# Patient Record
Sex: FEMALE | Race: WHITE | NOT HISPANIC OR LATINO | Employment: OTHER | URBAN - METROPOLITAN AREA
[De-identification: names, ages, dates, MRNs, and addresses within clinical notes are randomized per-mention and may not be internally consistent; named-entity substitution may affect disease eponyms.]

---

## 2023-04-11 ENCOUNTER — OFFICE VISIT (OUTPATIENT)
Dept: PRIMARY CARE | Facility: CLINIC | Age: 78
End: 2023-04-11
Payer: MEDICARE

## 2023-04-11 DIAGNOSIS — Z00.00 HEALTHCARE MAINTENANCE: ICD-10-CM

## 2023-04-11 DIAGNOSIS — Z71.9 HEALTH EDUCATION/COUNSELING: Primary | ICD-10-CM

## 2023-04-11 PROBLEM — M20.11 ACQUIRED HALLUX VALGUS OF BOTH FEET: Status: ACTIVE | Noted: 2023-04-11

## 2023-04-11 PROBLEM — S90.31XA CONTUSION OF RIGHT FOOT: Status: ACTIVE | Noted: 2023-04-11

## 2023-04-11 PROBLEM — M77.41 METATARSALGIA OF RIGHT FOOT: Status: ACTIVE | Noted: 2023-04-11

## 2023-04-11 PROBLEM — M20.12 ACQUIRED HALLUX VALGUS OF BOTH FEET: Status: ACTIVE | Noted: 2023-04-11

## 2023-04-11 PROBLEM — K21.9 GASTROESOPHAGEAL REFLUX DISEASE: Status: ACTIVE | Noted: 2018-07-26

## 2023-04-11 PROCEDURE — UHSMG PR UH SELECT MEET AND GREET: Performed by: FAMILY MEDICINE

## 2023-04-11 RX ORDER — LOSARTAN POTASSIUM 25 MG/1
TABLET ORAL
COMMUNITY
End: 2024-01-21 | Stop reason: SDUPTHER

## 2023-04-11 RX ORDER — CYANOCOBALAMIN (VITAMIN B-12) 2500 MCG
TABLET, SUBLINGUAL SUBLINGUAL
COMMUNITY
End: 2023-06-06 | Stop reason: ALTCHOICE

## 2023-04-11 RX ORDER — HYDROCHLOROTHIAZIDE 12.5 MG/1
12.5 CAPSULE ORAL DAILY
COMMUNITY
End: 2023-10-16

## 2023-04-11 RX ORDER — EPINEPHRINE 0.3 MG/.3ML
INJECTION SUBCUTANEOUS
COMMUNITY
Start: 2021-03-25

## 2023-04-11 RX ORDER — SPIRONOLACTONE 25 MG
TABLET ORAL
COMMUNITY

## 2023-04-11 RX ORDER — ROSUVASTATIN CALCIUM 10 MG/1
TABLET, COATED ORAL
COMMUNITY
End: 2023-10-16 | Stop reason: SDUPTHER

## 2023-04-11 RX ORDER — OMEPRAZOLE 40 MG/1
CAPSULE, DELAYED RELEASE ORAL
COMMUNITY
Start: 2020-12-22 | End: 2023-11-21 | Stop reason: WASHOUT

## 2023-04-11 RX ORDER — TRIAMCINOLONE ACETONIDE 1 MG/G
CREAM TOPICAL
COMMUNITY
Start: 2021-10-24

## 2023-05-01 ENCOUNTER — TELEPHONE (OUTPATIENT)
Dept: PRIMARY CARE | Facility: CLINIC | Age: 78
End: 2023-05-01
Payer: MEDICARE

## 2023-05-01 DIAGNOSIS — R05.2 SUBACUTE COUGH: Primary | ICD-10-CM

## 2023-05-01 RX ORDER — CODEINE PHOSPHATE AND GUAIFENESIN 10; 100 MG/5ML; MG/5ML
5-10 SOLUTION ORAL EVERY 8 HOURS PRN
Qty: 120 ML | Refills: 0 | Status: SHIPPED | OUTPATIENT
Start: 2023-05-01 | End: 2023-05-06

## 2023-05-01 NOTE — TELEPHONE ENCOUNTER
Sxs started last Tuesday - some hoarseness and cough   Bronchitis recently   Last CXR was about 10 days ago and normal  Occ has a hard time catching her breath  COVID test negative  Occ productive cough (not colored)  No fever/chills  Has used Mucinex - helped slightly   Did use some codeine (tablet)   No throat clearing  Discussed use of antihistamines as being potentially helpful for any cough  But will also try the following as he seem to get benefit from at least a similar med last night      I have personally reviewed the OARRS report for this patient.  The report is scanned into the EMR. I have considered the risks of abuse, dependence, addiction, and diversion.    Requested Prescriptions     Signed Prescriptions Disp Refills    codeine-guaifenesin (Robitussin-AC)  mg/5 mL syrup 120 mL 0     Sig: Take 5-10 mL by mouth every 8 hours if needed for cough for up to 5 days.     Authorizing Provider: BERENICE LOGAN         Plan to follow-up in 48-72 hours or sooner if worsening/concerns

## 2023-05-10 ENCOUNTER — TELEPHONE (OUTPATIENT)
Dept: PRIMARY CARE | Facility: CLINIC | Age: 78
End: 2023-05-10
Payer: MEDICARE

## 2023-05-10 NOTE — TELEPHONE ENCOUNTER
Cough x 3 weeks - still with some nasal drainage and feels in her cough - mouth is dry - taking water - not feeling ill at the moment - Tylenol - recommend a trial of Xyzal - touch base Monday if need be as is going out of town next week

## 2023-06-01 ENCOUNTER — LAB (OUTPATIENT)
Dept: LAB | Facility: LAB | Age: 78
End: 2023-06-01
Payer: MEDICARE

## 2023-06-01 DIAGNOSIS — Z00.00 HEALTHCARE MAINTENANCE: ICD-10-CM

## 2023-06-01 LAB
ALANINE AMINOTRANSFERASE (SGPT) (U/L) IN SER/PLAS: 19 U/L (ref 7–45)
ALBUMIN (G/DL) IN SER/PLAS: 4.3 G/DL (ref 3.4–5)
ALKALINE PHOSPHATASE (U/L) IN SER/PLAS: 67 U/L (ref 33–136)
ANION GAP IN SER/PLAS: 11 MMOL/L (ref 10–20)
ASPARTATE AMINOTRANSFERASE (SGOT) (U/L) IN SER/PLAS: 28 U/L (ref 9–39)
BASOPHILS (10*3/UL) IN BLOOD BY AUTOMATED COUNT: 0.02 X10E9/L (ref 0–0.1)
BASOPHILS/100 LEUKOCYTES IN BLOOD BY AUTOMATED COUNT: 0.4 % (ref 0–2)
BILIRUBIN TOTAL (MG/DL) IN SER/PLAS: 0.5 MG/DL (ref 0–1.2)
C REACTIVE PROTEIN (MG/L) IN SER/PLAS BY HIGH SENSIT: 2.3 MG/L
CALCIDIOL (25 OH VITAMIN D3) (NG/ML) IN SER/PLAS: 58 NG/ML
CALCIUM (MG/DL) IN SER/PLAS: 10.1 MG/DL (ref 8.6–10.3)
CARBON DIOXIDE, TOTAL (MMOL/L) IN SER/PLAS: 29 MMOL/L (ref 21–32)
CHLORIDE (MMOL/L) IN SER/PLAS: 97 MMOL/L (ref 98–107)
CHOLESTEROL (MG/DL) IN SER/PLAS: 183 MG/DL (ref 0–199)
CHOLESTEROL IN HDL (MG/DL) IN SER/PLAS: 87.1 MG/DL
CHOLESTEROL/HDL RATIO: 2.1
CREATININE (MG/DL) IN SER/PLAS: 0.72 MG/DL (ref 0.5–1.05)
EOSINOPHILS (10*3/UL) IN BLOOD BY AUTOMATED COUNT: 0.18 X10E9/L (ref 0–0.4)
EOSINOPHILS/100 LEUKOCYTES IN BLOOD BY AUTOMATED COUNT: 3.4 % (ref 0–6)
ERYTHROCYTE DISTRIBUTION WIDTH (RATIO) BY AUTOMATED COUNT: 13.3 % (ref 11.5–14.5)
ERYTHROCYTE MEAN CORPUSCULAR HEMOGLOBIN CONCENTRATION (G/DL) BY AUTOMATED: 33.3 G/DL (ref 32–36)
ERYTHROCYTE MEAN CORPUSCULAR VOLUME (FL) BY AUTOMATED COUNT: 89 FL (ref 80–100)
ERYTHROCYTES (10*6/UL) IN BLOOD BY AUTOMATED COUNT: 4.44 X10E12/L (ref 4–5.2)
ESTIMATED AVERAGE GLUCOSE FOR HBA1C: 117 MG/DL
GFR FEMALE: 86 ML/MIN/1.73M2
GLUCOSE (MG/DL) IN SER/PLAS: 98 MG/DL (ref 74–99)
HEMATOCRIT (%) IN BLOOD BY AUTOMATED COUNT: 39.6 % (ref 36–46)
HEMOGLOBIN (G/DL) IN BLOOD: 13.2 G/DL (ref 12–16)
HEMOGLOBIN A1C/HEMOGLOBIN TOTAL IN BLOOD: 5.7 %
IMMATURE GRANULOCYTES/100 LEUKOCYTES IN BLOOD BY AUTOMATED COUNT: 0.2 % (ref 0–0.9)
LDL: 80 MG/DL (ref 0–99)
LEUKOCYTES (10*3/UL) IN BLOOD BY AUTOMATED COUNT: 5.3 X10E9/L (ref 4.4–11.3)
LYMPHOCYTES (10*3/UL) IN BLOOD BY AUTOMATED COUNT: 1.76 X10E9/L (ref 0.8–3)
LYMPHOCYTES/100 LEUKOCYTES IN BLOOD BY AUTOMATED COUNT: 33 % (ref 13–44)
MONOCYTES (10*3/UL) IN BLOOD BY AUTOMATED COUNT: 0.68 X10E9/L (ref 0.05–0.8)
MONOCYTES/100 LEUKOCYTES IN BLOOD BY AUTOMATED COUNT: 12.8 % (ref 2–10)
NEUTROPHILS (10*3/UL) IN BLOOD BY AUTOMATED COUNT: 2.68 X10E9/L (ref 1.6–5.5)
NEUTROPHILS/100 LEUKOCYTES IN BLOOD BY AUTOMATED COUNT: 50.2 % (ref 40–80)
PLATELETS (10*3/UL) IN BLOOD AUTOMATED COUNT: 267 X10E9/L (ref 150–450)
POTASSIUM (MMOL/L) IN SER/PLAS: 4.2 MMOL/L (ref 3.5–5.3)
PROTEIN TOTAL: 7.2 G/DL (ref 6.4–8.2)
SODIUM (MMOL/L) IN SER/PLAS: 133 MMOL/L (ref 136–145)
THYROTROPIN (MIU/L) IN SER/PLAS BY DETECTION LIMIT <= 0.05 MIU/L: 1.79 MIU/L (ref 0.44–3.98)
TRIGLYCERIDE (MG/DL) IN SER/PLAS: 82 MG/DL (ref 0–149)
UREA NITROGEN (MG/DL) IN SER/PLAS: 20 MG/DL (ref 6–23)
VLDL: 16 MG/DL (ref 0–40)

## 2023-06-01 PROCEDURE — 82306 VITAMIN D 25 HYDROXY: CPT

## 2023-06-01 PROCEDURE — 85025 COMPLETE CBC W/AUTO DIFF WBC: CPT

## 2023-06-01 PROCEDURE — 86141 C-REACTIVE PROTEIN HS: CPT

## 2023-06-01 PROCEDURE — 80053 COMPREHEN METABOLIC PANEL: CPT

## 2023-06-01 PROCEDURE — 36415 COLL VENOUS BLD VENIPUNCTURE: CPT

## 2023-06-01 PROCEDURE — 83036 HEMOGLOBIN GLYCOSYLATED A1C: CPT

## 2023-06-01 PROCEDURE — 84443 ASSAY THYROID STIM HORMONE: CPT

## 2023-06-01 PROCEDURE — 80061 LIPID PANEL: CPT

## 2023-06-06 ENCOUNTER — OFFICE VISIT (OUTPATIENT)
Dept: PRIMARY CARE | Facility: CLINIC | Age: 78
End: 2023-06-06
Payer: MEDICARE

## 2023-06-06 VITALS
DIASTOLIC BLOOD PRESSURE: 60 MMHG | HEIGHT: 62 IN | WEIGHT: 124 LBS | TEMPERATURE: 97.9 F | OXYGEN SATURATION: 100 % | SYSTOLIC BLOOD PRESSURE: 120 MMHG | HEART RATE: 62 BPM | BODY MASS INDEX: 22.82 KG/M2

## 2023-06-06 DIAGNOSIS — M20.12 ACQUIRED HALLUX VALGUS OF BOTH FEET: ICD-10-CM

## 2023-06-06 DIAGNOSIS — I10 ESSENTIAL HYPERTENSION, BENIGN: ICD-10-CM

## 2023-06-06 DIAGNOSIS — E78.5 DYSLIPIDEMIA: ICD-10-CM

## 2023-06-06 DIAGNOSIS — C67.9 MALIGNANT NEOPLASM OF URINARY BLADDER, UNSPECIFIED SITE (MULTI): ICD-10-CM

## 2023-06-06 DIAGNOSIS — M20.11 ACQUIRED HALLUX VALGUS OF BOTH FEET: ICD-10-CM

## 2023-06-06 DIAGNOSIS — Z82.49 FAMILY HISTORY OF CORONARY ARTERY DISEASE: ICD-10-CM

## 2023-06-06 DIAGNOSIS — K21.9 GASTROESOPHAGEAL REFLUX DISEASE, UNSPECIFIED WHETHER ESOPHAGITIS PRESENT: ICD-10-CM

## 2023-06-06 DIAGNOSIS — Z00.01 ENCOUNTER FOR GENERAL ADULT MEDICAL EXAMINATION WITH ABNORMAL FINDINGS: Primary | ICD-10-CM

## 2023-06-06 PROCEDURE — 1036F TOBACCO NON-USER: CPT | Performed by: FAMILY MEDICINE

## 2023-06-06 PROCEDURE — 3074F SYST BP LT 130 MM HG: CPT | Performed by: FAMILY MEDICINE

## 2023-06-06 PROCEDURE — 3078F DIAST BP <80 MM HG: CPT | Performed by: FAMILY MEDICINE

## 2023-06-06 PROCEDURE — 1159F MED LIST DOCD IN RCRD: CPT | Performed by: FAMILY MEDICINE

## 2023-06-06 PROCEDURE — UHSPHYS PR UH SELECT PHYSICAL: Performed by: FAMILY MEDICINE

## 2023-06-06 RX ORDER — MAGNESIUM GLYCINATE 100 MG
2 CAPSULE ORAL DAILY
COMMUNITY

## 2023-06-06 RX ORDER — TRAZODONE HYDROCHLORIDE 50 MG/1
50 TABLET ORAL NIGHTLY
COMMUNITY
End: 2023-10-16 | Stop reason: SDUPTHER

## 2023-06-06 RX ORDER — ALUMINUM ZIRCONIUM OCTACHLOROHYDREX GLY 16 G/100G
1 GEL TOPICAL EVERY MORNING
COMMUNITY

## 2023-06-06 RX ORDER — DEXTROMETHORPHAN HYDROBROMIDE, GUAIFENESIN 5; 100 MG/5ML; MG/5ML
650 LIQUID ORAL EVERY 8 HOURS PRN
COMMUNITY

## 2023-06-06 ASSESSMENT — ENCOUNTER SYMPTOMS
LOSS OF SENSATION IN FEET: 0
OCCASIONAL FEELINGS OF UNSTEADINESS: 0
DEPRESSION: 0

## 2023-06-06 ASSESSMENT — PATIENT HEALTH QUESTIONNAIRE - PHQ9
1. LITTLE INTEREST OR PLEASURE IN DOING THINGS: NOT AT ALL
2. FEELING DOWN, DEPRESSED OR HOPELESS: NOT AT ALL
SUM OF ALL RESPONSES TO PHQ9 QUESTIONS 1 & 2: 0

## 2023-06-06 ASSESSMENT — PAIN SCALES - GENERAL: PAINLEVEL: 0-NO PAIN

## 2023-06-07 PROBLEM — E78.5 DYSLIPIDEMIA: Status: ACTIVE | Noted: 2023-06-07

## 2023-06-07 PROBLEM — I10 ESSENTIAL HYPERTENSION, BENIGN: Status: ACTIVE | Noted: 2023-06-07

## 2023-06-07 PROBLEM — C67.9 MALIGNANT NEOPLASM OF URINARY BLADDER (MULTI): Status: ACTIVE | Noted: 2023-06-07

## 2023-06-07 PROBLEM — Z82.49 FAMILY HISTORY OF CORONARY ARTERY DISEASE: Status: ACTIVE | Noted: 2023-06-07

## 2023-06-07 ASSESSMENT — ENCOUNTER SYMPTOMS
CONSTIPATION: 0
JOINT SWELLING: 1
SLEEP DISTURBANCE: 0
FEVER: 0
DYSPHORIC MOOD: 0
EYE PAIN: 0
MYALGIAS: 0
BLOOD IN STOOL: 0
TREMORS: 0
FATIGUE: 0
DIARRHEA: 0
DIZZINESS: 0
NERVOUS/ANXIOUS: 0
UNEXPECTED WEIGHT CHANGE: 0
BACK PAIN: 0
SHORTNESS OF BREATH: 0
WEAKNESS: 0
DIAPHORESIS: 0
HEADACHES: 0
ABDOMINAL PAIN: 0

## 2023-06-07 NOTE — PROGRESS NOTES
Subjective   Patient ID: Mckayla Michel is a 78 y.o. female who presents for Annual Exam ([facility]).  HPI  1) bladder cancer being followed at the [facility]  Some issues have arisen with changes in her urine cytology x2  We will have CM call on Monday to determine best next steps  It is possible she may need further surgery and would be interested in having assessment by cardiologist to better understand her fitness for a procedure    2) As noted above, would like cardiology referral  Known history of lipids and blood pressure  Denies chest pain, shortness of breath, dizziness, lightheadedness, hand or foot swelling that is new or different.  She has noticed when she elevates his legs for prolonged period of time it seems to resolve the leg swelling  - taking and tolerating medications well.  Doing well with physical activity.    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   HENT:  Negative for ear pain.    Eyes:  Negative for pain and visual disturbance.   Respiratory:  Negative for shortness of breath.    Cardiovascular:  Positive for leg swelling. Negative for chest pain.   Gastrointestinal:  Negative for abdominal pain, blood in stool, constipation and diarrhea.   Genitourinary:         S/p cystectomy - has stoma and  "bag   Musculoskeletal:  Positive for joint swelling. Negative for back pain and myalgias.        Bunions bilat - sig foot pain - has seen ortho   Skin:  Negative for rash.   Neurological:  Negative for dizziness, tremors, weakness and headaches.   Psychiatric/Behavioral:  Negative for behavioral problems, dysphoric mood and sleep disturbance. The patient is not nervous/anxious.        Objective   /60 (BP Location: Left arm, Patient Position: Sitting, BP Cuff Size: Adult)   Pulse 62   Temp 36.6 °C (97.9 °F) (Temporal)   Ht 1.58 m (5' 2.21\")   Wt 56.2 kg (124 lb)   SpO2 100%   BMI 22.53 kg/m²     Physical Exam  Vitals and nursing note reviewed.   Constitutional:       General: She is not in acute distress.     Appearance: She is not ill-appearing.   HENT:      Head: Normocephalic and atraumatic.      Right Ear: Tympanic membrane normal.      Left Ear: Tympanic membrane normal.      Mouth/Throat:      Pharynx: No posterior oropharyngeal erythema.   Eyes:      General: No scleral icterus.     Extraocular Movements: Extraocular movements intact.      Pupils: Pupils are equal, round, and reactive to light.   Cardiovascular:      Rate and Rhythm: Normal rate and regular rhythm.      Heart sounds: No murmur heard.  Pulmonary:      Breath sounds: Normal breath sounds. No wheezing or rhonchi.   Abdominal:      General: Bowel sounds are normal. There is no distension.      Palpations: Abdomen is soft.      Tenderness: There is no abdominal tenderness.   Musculoskeletal:         General: Deformity present. Normal range of motion.      Cervical back: Normal range of motion.      Comments: Bilat bunions / hallux valgus - BLE swelling L>R   Lymphadenopathy:      Cervical: No cervical adenopathy.   Skin:     General: Skin is warm and dry.   Neurological:      Mental Status: She is alert and oriented to person, place, and time. Mental status is at baseline.      Motor: No weakness.      Coordination: Coordination normal. "      Deep Tendon Reflexes: Reflexes normal.   Psychiatric:         Mood and Affect: Mood normal.         Behavior: Behavior normal.         Thought Content: Thought content normal.         Judgment: Judgment normal.         Assessment/Plan   Problem List Items Addressed This Visit          Circulatory    Essential hypertension, benign    Relevant Orders    Referral to Cardiology       Digestive    Gastroesophageal reflux disease       Genitourinary    Malignant neoplasm of urinary bladder (CMS/HCC)       Musculoskeletal    Acquired hallux valgus of both feet       Other    Dyslipidemia    Relevant Orders    Referral to Cardiology    Family history of coronary artery disease    Relevant Orders    Referral to Cardiology     Other Visit Diagnoses       Encounter for general adult medical examination with abnormal findings    -  Primary        1) bladder cancer - follow-up ml next Mon    2) Lipids/HTN - stable - ref to Cardio     3) cough and voice change - suspect GERD  Add PPI at hs and see if in 1 week makes a diff - consider ENT    4) Foot issuse - refer to [facility]    5) Prev care - has been getting mamms (last in 3/23)  Due for C-scope later this year - would likely have Anderson

## 2023-07-14 ENCOUNTER — TELEPHONE (OUTPATIENT)
Dept: PRIMARY CARE | Facility: CLINIC | Age: 78
End: 2023-07-14
Payer: MEDICARE

## 2023-07-16 NOTE — TELEPHONE ENCOUNTER
Called and discussed with patient  Simply very frustrated about her having procedure postponed  She had reached out to Dr. Quevedo about this and plans to follow-up with her as well as me to see if there might be something that could be done sooner

## 2023-08-17 ENCOUNTER — TELEPHONE (OUTPATIENT)
Dept: PRIMARY CARE | Facility: CLINIC | Age: 78
End: 2023-08-17
Payer: MEDICARE

## 2023-08-24 NOTE — TELEPHONE ENCOUNTER
Called back the other day and discussed with patient  Has been in touch with Dr. Quevedo's office on this and they are working to get information from the Clinic - enc to reach out to us as need be

## 2023-09-14 ENCOUNTER — TELEPHONE (OUTPATIENT)
Dept: PRIMARY CARE | Facility: CLINIC | Age: 78
End: 2023-09-14
Payer: MEDICARE

## 2023-09-14 DIAGNOSIS — R49.0 HOARSENESS: Primary | ICD-10-CM

## 2023-09-14 NOTE — TELEPHONE ENCOUNTER
Called the patient, no answer left message    Given what she is describing, highly suspicious this could be a reflux related phenomenon  I would recommend a trial of either doubling up her current omeprazole dosing or if she is not taking it all then getting back on it at least through the weekend to see how much that can alleviate her hoarseness    Request she call back if like further discussion about this -also could even evaluate in the office tomorrow if need be    Did call back and d/w her - in the end would be best served by seeing ENT - suggested Rosi

## 2023-09-19 ENCOUNTER — TELEPHONE (OUTPATIENT)
Dept: PRIMARY CARE | Facility: CLINIC | Age: 78
End: 2023-09-19
Payer: MEDICARE

## 2023-10-16 DIAGNOSIS — E78.5 DYSLIPIDEMIA: ICD-10-CM

## 2023-10-16 DIAGNOSIS — G47.00 INSOMNIA, UNSPECIFIED TYPE: ICD-10-CM

## 2023-10-16 RX ORDER — TRAZODONE HYDROCHLORIDE 50 MG/1
50 TABLET ORAL NIGHTLY
Qty: 90 TABLET | Refills: 3 | Status: SHIPPED | OUTPATIENT
Start: 2023-10-16 | End: 2023-11-27 | Stop reason: DRUGHIGH

## 2023-10-16 RX ORDER — ROSUVASTATIN CALCIUM 10 MG/1
TABLET, COATED ORAL
Qty: 90 TABLET | Refills: 3 | Status: SHIPPED | OUTPATIENT
Start: 2023-10-16

## 2023-10-19 ENCOUNTER — OFFICE VISIT (OUTPATIENT)
Dept: OTOLARYNGOLOGY | Facility: CLINIC | Age: 78
End: 2023-10-19
Payer: MEDICARE

## 2023-10-19 VITALS — TEMPERATURE: 97.2 F | HEIGHT: 63 IN | BODY MASS INDEX: 21.79 KG/M2 | WEIGHT: 123 LBS

## 2023-10-19 DIAGNOSIS — J37.0 CHRONIC LARYNGITIS: ICD-10-CM

## 2023-10-19 DIAGNOSIS — R49.0 CHRONIC HOARSENESS: Primary | ICD-10-CM

## 2023-10-19 DIAGNOSIS — R49.0 HOARSENESS: ICD-10-CM

## 2023-10-19 DIAGNOSIS — K21.9 GASTROESOPHAGEAL REFLUX DISEASE WITHOUT ESOPHAGITIS: ICD-10-CM

## 2023-10-19 PROCEDURE — 1159F MED LIST DOCD IN RCRD: CPT | Performed by: OTOLARYNGOLOGY

## 2023-10-19 PROCEDURE — 31575 DIAGNOSTIC LARYNGOSCOPY: CPT | Performed by: OTOLARYNGOLOGY

## 2023-10-19 PROCEDURE — 99203 OFFICE O/P NEW LOW 30 MIN: CPT | Performed by: OTOLARYNGOLOGY

## 2023-10-19 PROCEDURE — 1036F TOBACCO NON-USER: CPT | Performed by: OTOLARYNGOLOGY

## 2023-10-19 PROCEDURE — 1126F AMNT PAIN NOTED NONE PRSNT: CPT | Performed by: OTOLARYNGOLOGY

## 2023-10-19 RX ORDER — OMEPRAZOLE 40 MG/1
40 CAPSULE, DELAYED RELEASE ORAL DAILY
Qty: 90 CAPSULE | Refills: 0 | Status: SHIPPED | OUTPATIENT
Start: 2023-10-19 | End: 2023-11-27 | Stop reason: WASHOUT

## 2023-10-19 ASSESSMENT — ENCOUNTER SYMPTOMS: COUGH: 1

## 2023-10-19 NOTE — PROGRESS NOTES
Subjective   Patient ID: Mckayla Michel is a 78 y.o. female who presents for No chief complaint on file..  HPI  Patient is complaining of a chronic history of mild hoarseness.  She has no hemoptysis or dysphagia.  She did have multiple episodes of bronchitis earlier this year treated with antibiotics.  She did have a chest x-ray in April which was negative.   Review of Systems   HENT:  Positive for postnasal drip.         Hoarseness   Respiratory:  Positive for cough.        Objective   Physical Exam  The following elements of a brief ear nose and throat exam were performed: External ear canals and tympanic membranes, external nose and nasal passages, oral cavity, palpation of the neck, percussion of the face, palpation of the thyroid.    There is mild hoarseness noted.  Indirect mirror laryngoscopy is not possible due to a strong gag reflex.  The remainder of her exam was within normal limits.  Fiberoptic laryngoscopy was offered in light of chronic hoarseness.  The nasal cavity and nasopharynx and hypopharynx were noted to be clear.  There was good vocal cord motion noted bilaterally.  There was posterior interarytenoid edema and scarring noted.  Laryngoscopy    Date/Time: 10/19/2023 2:36 PM    Performed by: Pete Aranda MD  Authorized by: Pete Aranda MD    Consent:     Consent obtained:  Verbal  Procedure details:     Indications: hoarseness, dysphagia, or aspiration      Meds administered: Lidocaine and Afrin spray.    Scope location: bilateral nare       Assessment/Plan   Diagnoses and all orders for this visit:  Chronic hoarseness  -     Laryngoscopy  Hoarseness  -     Referral to ENT  Chronic laryngitis  Gastroesophageal reflux disease without esophagitis  -     omeprazole (PriLOSEC) 40 mg DR capsule; Take 1 capsule (40 mg) by mouth once daily. Do not crush or chew.    Chronic gastroesophageal reflux leading to chronic laryngitis and hoarseness exacerbated by the recent episodes of bronchitis.  The patient  was started on omeprazole 20 mg/day instead of 20 mg/day and she was advised to follow acid reflux precautions.  She will follow-up in 1 month.

## 2023-10-19 NOTE — LETTER
October 19, 2023     Luis E Plunkett MD  [address] [facility]  [place] [address]    Patient: Mckayla Michel   YOB: 1945   Date of Visit: 10/19/2023       Dear Dr. Luis E Plunkett MD:    Thank you for referring Mckayla Michel to me for evaluation. Below are my notes for this consultation.  If you have questions, please do not hesitate to call me. I look forward to following your patient along with you.       Sincerely,     Pete Aranda MD      CC: No Recipients  ______________________________________________________________________________________    Subjective  Patient ID: Mckayla Michel is a 78 y.o. female who presents for No chief complaint on file..  HPI  Patient is complaining of a chronic history of mild hoarseness.  She has no hemoptysis or dysphagia.  She did have multiple episodes of bronchitis earlier this year treated with antibiotics.  She did have a chest x-ray in April which was negative.   Review of Systems   HENT:  Positive for postnasal drip.         Hoarseness   Respiratory:  Positive for cough.        Objective  Physical Exam  The following elements of a brief ear nose and throat exam were performed: External ear canals and tympanic membranes, external nose and nasal passages, oral cavity, palpation of the neck, percussion of the face, palpation of the thyroid.    There is mild hoarseness noted.  Indirect mirror laryngoscopy is not possible due to a strong gag reflex.  The remainder of her exam was within normal limits.  Fiberoptic laryngoscopy was offered in light of chronic hoarseness.  The nasal cavity and nasopharynx and hypopharynx were noted to be clear.  There was good vocal cord motion noted bilaterally.  There was posterior interarytenoid edema and scarring noted.  Laryngoscopy    Date/Time: 10/19/2023 2:36 PM    Performed by: Pete Aranda MD  Authorized by: Pete Aranda MD    Consent:     Consent obtained:  Verbal  Procedure details:      Indications: hoarseness, dysphagia, or aspiration      Meds administered: Lidocaine and Afrin spray.    Scope location: bilateral nare       Assessment/Plan  Diagnoses and all orders for this visit:  Chronic hoarseness  -     Laryngoscopy  Hoarseness  -     Referral to ENT  Chronic laryngitis  Gastroesophageal reflux disease without esophagitis  -     omeprazole (PriLOSEC) 40 mg DR capsule; Take 1 capsule (40 mg) by mouth once daily. Do not crush or chew.    Chronic gastroesophageal reflux leading to chronic laryngitis and hoarseness exacerbated by the recent episodes of bronchitis.  The patient was started on omeprazole 20 mg/day instead of 20 mg/day and she was advised to follow acid reflux precautions.  She will follow-up in 1 month.

## 2023-10-25 ENCOUNTER — TELEPHONE (OUTPATIENT)
Dept: OTOLARYNGOLOGY | Facility: CLINIC | Age: 78
End: 2023-10-25
Payer: MEDICARE

## 2023-11-21 ENCOUNTER — OFFICE VISIT (OUTPATIENT)
Dept: OTOLARYNGOLOGY | Facility: CLINIC | Age: 78
End: 2023-11-21
Payer: MEDICARE

## 2023-11-21 VITALS — WEIGHT: 123 LBS | BODY MASS INDEX: 22.63 KG/M2 | HEIGHT: 62 IN

## 2023-11-21 DIAGNOSIS — K21.9 GASTROESOPHAGEAL REFLUX DISEASE WITHOUT ESOPHAGITIS: ICD-10-CM

## 2023-11-21 DIAGNOSIS — R49.0 HOARSENESS: Primary | ICD-10-CM

## 2023-11-21 DIAGNOSIS — J37.0 CHRONIC LARYNGITIS: ICD-10-CM

## 2023-11-21 DIAGNOSIS — R49.0 CHRONIC HOARSENESS: ICD-10-CM

## 2023-11-21 PROCEDURE — 99213 OFFICE O/P EST LOW 20 MIN: CPT | Performed by: OTOLARYNGOLOGY

## 2023-11-21 PROCEDURE — 1036F TOBACCO NON-USER: CPT | Performed by: OTOLARYNGOLOGY

## 2023-11-21 PROCEDURE — 1126F AMNT PAIN NOTED NONE PRSNT: CPT | Performed by: OTOLARYNGOLOGY

## 2023-11-21 PROCEDURE — 1159F MED LIST DOCD IN RCRD: CPT | Performed by: OTOLARYNGOLOGY

## 2023-11-21 RX ORDER — OMEPRAZOLE 40 MG/1
40 CAPSULE, DELAYED RELEASE ORAL DAILY
Qty: 90 CAPSULE | Refills: 1 | Status: SHIPPED | OUTPATIENT
Start: 2023-11-21 | End: 2023-11-27 | Stop reason: WASHOUT

## 2023-11-21 NOTE — PROGRESS NOTES
Subjective   Patient ID: Mckayla Michel is a 78 y.o. female  HPI  Patient presents for follow-up for chronic gastroesophageal reflux with chronic laryngitis and hoarseness.  She is feeling better and her voice is improved but she continues to complain of mild raspiness in her voice.  She has no hemoptysis or dysphagia.  Review of Systems    Objective   Physical Exam  The following elements of a brief ear nose and throat exam were performed: External ear canals and tympanic membranes, external nose and nasal passages, oral cavity, palpation of the neck, percussion of the face, palpation of the thyroid.    The oral cavity is clear.  There is mild hoarseness noted.  Indirect mirror laryngoscopy is limited due to strong gag reflex and there is good vocal cord motion noted bilaterally.  Assessment/Plan   Diagnoses and all orders for this visit:  Hoarseness (Primary)  -     Referral to Physical Therapy; Future  Chronic hoarseness  Chronic laryngitis  Gastroesophageal reflux disease without esophagitis  -     omeprazole (PriLOSEC) 40 mg DR capsule; Take 1 capsule (40 mg) by mouth once daily. Do not crush or chew.  -     Referral to Gastroenterology; Future  -     EGD; Future     Chronic mild hoarseness secondary to a combination of gastroesophageal reflux leading to chronic laryngitis in addition to bowing of the vocal cords and some posterior interarytenoid scarring due to the recent coughing and acid reflux.  Her voice has improved but has not entirely returned to normal.  The patient was advised to continue the omeprazole on a daily basis for 1 more month.  She was also referred to a gastroenterologist for further evaluation and EGD and she was referred to speech therapy.  Her prescription was renewed and she will follow-up in 6 months.

## 2023-11-27 ENCOUNTER — TELEPHONE (OUTPATIENT)
Dept: PRIMARY CARE | Facility: CLINIC | Age: 78
End: 2023-11-27
Payer: MEDICARE

## 2023-11-27 DIAGNOSIS — K21.9 GASTROESOPHAGEAL REFLUX DISEASE WITHOUT ESOPHAGITIS: ICD-10-CM

## 2023-11-27 DIAGNOSIS — G47.00 INSOMNIA, UNSPECIFIED TYPE: ICD-10-CM

## 2023-11-27 RX ORDER — OMEPRAZOLE 40 MG/1
40 CAPSULE, DELAYED RELEASE ORAL DAILY
Qty: 90 CAPSULE | Refills: 0 | COMMUNITY
Start: 2023-11-27 | End: 2024-01-21 | Stop reason: HOSPADM

## 2023-11-27 RX ORDER — TRAZODONE HYDROCHLORIDE 50 MG/1
25 TABLET ORAL NIGHTLY
Qty: 90 TABLET | Refills: 3 | Status: SHIPPED | COMMUNITY
Start: 2023-11-27 | End: 2024-01-21 | Stop reason: SDUPTHER

## 2023-11-27 NOTE — TELEPHONE ENCOUNTER
1) seen by Dr Aranda - was on Omeprazole at hs to reduce hoarseness - better - suggested to go to speech and voice therapist   Is on Trazodone at night and use may be related (seems to be about 1/3 the time) to the dry mouth and perhaps the cough / throat issues     2) will hold off on ST and EGD for now     3) decrease Trazodone to 25 mg - cont PPI - consider updating me next week to see how doing

## 2023-12-07 ENCOUNTER — TELEPHONE (OUTPATIENT)
Dept: PRIMARY CARE | Facility: CLINIC | Age: 78
End: 2023-12-07
Payer: MEDICARE

## 2023-12-07 DIAGNOSIS — K21.9 GASTROESOPHAGEAL REFLUX DISEASE, UNSPECIFIED WHETHER ESOPHAGITIS PRESENT: Primary | ICD-10-CM

## 2023-12-08 RX ORDER — ESOMEPRAZOLE MAGNESIUM 40 MG/1
40 CAPSULE, DELAYED RELEASE ORAL 2 TIMES DAILY
Qty: 60 CAPSULE | Refills: 2 | Status: SHIPPED | OUTPATIENT
Start: 2023-12-08 | End: 2024-01-21 | Stop reason: DRUGHIGH

## 2023-12-08 NOTE — TELEPHONE ENCOUNTER
Called and discussed with patient  Dry mouth really abated when she cut down on her trazodone use -but has led to some sleep struggles, will follow  Still has the hoarseness  Would recommend she switch to esomeprazole and do at least a 5-day trial of that to see if decreases cough    Requested Prescriptions     Signed Prescriptions Disp Refills    esomeprazole (NexIUM) 40 mg DR capsule 60 capsule 2     Sig: Take 1 capsule (40 mg) by mouth 2 times a day. Do not open capsule.     Authorizing Provider: BERENICE LOGAN

## 2023-12-27 ENCOUNTER — APPOINTMENT (OUTPATIENT)
Dept: RADIOLOGY | Facility: CLINIC | Age: 78
End: 2023-12-27
Payer: MEDICARE

## 2024-01-03 ENCOUNTER — TELEPHONE (OUTPATIENT)
Dept: PRIMARY CARE | Facility: CLINIC | Age: 79
End: 2024-01-03
Payer: MEDICARE

## 2024-01-03 NOTE — TELEPHONE ENCOUNTER
Pt is calling in regarsd to talking about a medication and seeing if she should still be on it - DAVONTEP     , Podl patient

## 2024-01-04 ENCOUNTER — APPOINTMENT (OUTPATIENT)
Dept: GASTROENTEROLOGY | Facility: CLINIC | Age: 79
End: 2024-01-04
Payer: MEDICARE

## 2024-01-21 DIAGNOSIS — G47.00 INSOMNIA, UNSPECIFIED TYPE: ICD-10-CM

## 2024-01-21 DIAGNOSIS — I10 ESSENTIAL HYPERTENSION, BENIGN: Primary | ICD-10-CM

## 2024-01-21 DIAGNOSIS — K21.9 GASTROESOPHAGEAL REFLUX DISEASE, UNSPECIFIED WHETHER ESOPHAGITIS PRESENT: ICD-10-CM

## 2024-01-21 RX ORDER — HYDROGEN PEROXIDE 3 %
20 SOLUTION, NON-ORAL MISCELLANEOUS 2 TIMES DAILY
Qty: 180 CAPSULE | Refills: 3 | Status: SHIPPED | OUTPATIENT
Start: 2024-01-21

## 2024-01-21 RX ORDER — LOSARTAN POTASSIUM 25 MG/1
25 TABLET ORAL DAILY
Qty: 90 TABLET | Refills: 3 | Status: SHIPPED | OUTPATIENT
Start: 2024-01-21 | End: 2024-01-22 | Stop reason: SDUPTHER

## 2024-01-21 RX ORDER — TRAZODONE HYDROCHLORIDE 50 MG/1
25 TABLET ORAL NIGHTLY
Qty: 45 TABLET | Refills: 3 | Status: SHIPPED | OUTPATIENT
Start: 2024-01-21 | End: 2024-05-21 | Stop reason: SDUPTHER

## 2024-01-21 RX ORDER — HYDROGEN PEROXIDE 3 %
20 SOLUTION, NON-ORAL MISCELLANEOUS 2 TIMES DAILY
Qty: 180 CAPSULE | Refills: 3 | Status: SHIPPED | OUTPATIENT
Start: 2024-01-21 | End: 2024-01-21 | Stop reason: HOSPADM

## 2024-01-21 NOTE — PROGRESS NOTES
Reached out to patient about trazodone prescription  She has done much better on the lower dose and has no dry mouth at present  Would like to try 25 mg tablet if available    Also feels her cough is really absent now - found that the esomeprazole change really made a difference  We will reduce her dosing to 20 mg twice daily and she will see if she can taper off from there    Also, needs refill on her losartan    Will send all prescriptions to [facility] as requested    Requested Prescriptions     Signed Prescriptions Disp Refills    losartan (Cozaar) 25 mg tablet 90 tablet 3     Sig: Take 1 tablet (25 mg) by mouth once daily.    traZODone (Desyrel) 50 mg tablet 45 tablet 3     Sig: Take 0.5 tablets (25 mg) by mouth once daily at bedtime.    esomeprazole (NexIUM) 20 mg DR capsule 180 capsule 3     Sig: Take 1 capsule (20 mg) by mouth 2 times a day. Do not open capsule.   \

## 2024-01-22 DIAGNOSIS — I10 ESSENTIAL HYPERTENSION, BENIGN: ICD-10-CM

## 2024-01-22 RX ORDER — LOSARTAN POTASSIUM 25 MG/1
25 TABLET ORAL DAILY
Qty: 90 TABLET | Refills: 3 | Status: SHIPPED | OUTPATIENT
Start: 2024-01-22

## 2024-01-31 DIAGNOSIS — Z12.31 ENCOUNTER FOR SCREENING MAMMOGRAM FOR MALIGNANT NEOPLASM OF BREAST: ICD-10-CM

## 2024-04-18 DIAGNOSIS — Z00.00 HEALTHCARE MAINTENANCE: ICD-10-CM

## 2024-05-02 ENCOUNTER — HOSPITAL ENCOUNTER (OUTPATIENT)
Dept: RADIOLOGY | Facility: EXTERNAL LOCATION | Age: 79
Discharge: HOME | End: 2024-05-02
Payer: MEDICARE

## 2024-05-02 ENCOUNTER — TELEPHONE (OUTPATIENT)
Dept: PRIMARY CARE | Facility: CLINIC | Age: 79
End: 2024-05-02
Payer: MEDICARE

## 2024-05-02 DIAGNOSIS — R92.8 ABNORMALITY OF RIGHT BREAST ON SCREENING MAMMOGRAM: ICD-10-CM

## 2024-05-02 NOTE — TELEPHONE ENCOUNTER
She recently had a mammogram done at [facility].  She she received notification that there were some concerns with the right breast and re imaging was needed.  [facility] is requesting an order before they will schedule her for anything.   I will fax over diagnostic mammo this date.  Patient notified and will schedule.

## 2024-05-03 NOTE — TELEPHONE ENCOUNTER
Had called back to patient yesterday evening and discussed her mammogram findings  She has had a abnormal screening mammogram in the past due to dense breast tissue  Not a high degree of suspicion on this report itself, but still needs to pursue, she plans to do so    States she is still having some difficulty getting off the acid reducer in the evening  Finds the hoarseness does return when she stops it  She also questions the need to follow-up with ENT  Suggest she could defer that for now but would recommend just getting back on the acid reducer and monitor things going forward and perhaps do another challenge down the road of try to go off of it at night

## 2024-05-14 ENCOUNTER — APPOINTMENT (OUTPATIENT)
Dept: OTOLARYNGOLOGY | Facility: CLINIC | Age: 79
End: 2024-05-14
Payer: MEDICARE

## 2024-05-21 DIAGNOSIS — G47.00 INSOMNIA, UNSPECIFIED TYPE: ICD-10-CM

## 2024-05-21 RX ORDER — TRAZODONE HYDROCHLORIDE 50 MG/1
25-50 TABLET ORAL NIGHTLY
Qty: 90 TABLET | Refills: 1 | Status: SHIPPED | OUTPATIENT
Start: 2024-05-21

## 2024-06-06 ASSESSMENT — PROMIS GLOBAL HEALTH SCALE
RATE_AVERAGE_FATIGUE: MILD
EMOTIONAL_PROBLEMS: RARELY
RATE_AVERAGE_PAIN: 2
CARRYOUT_PHYSICAL_ACTIVITIES: MODERATELY
RATE_QUALITY_OF_LIFE: VERY GOOD
RATE_PHYSICAL_HEALTH: VERY GOOD
RATE_GENERAL_HEALTH: VERY GOOD
CARRYOUT_SOCIAL_ACTIVITIES: VERY GOOD
RATE_SOCIAL_SATISFACTION: EXCELLENT
RATE_MENTAL_HEALTH: EXCELLENT

## 2024-06-12 ENCOUNTER — OFFICE VISIT (OUTPATIENT)
Dept: PRIMARY CARE | Facility: CLINIC | Age: 79
End: 2024-06-12
Payer: MEDICARE

## 2024-06-12 ENCOUNTER — APPOINTMENT (OUTPATIENT)
Dept: PRIMARY CARE | Facility: CLINIC | Age: 79
End: 2024-06-12
Payer: MEDICARE

## 2024-06-12 VITALS
WEIGHT: 122 LBS | SYSTOLIC BLOOD PRESSURE: 123 MMHG | TEMPERATURE: 98 F | OXYGEN SATURATION: 100 % | BODY MASS INDEX: 22.45 KG/M2 | DIASTOLIC BLOOD PRESSURE: 80 MMHG | HEIGHT: 62 IN | HEART RATE: 59 BPM

## 2024-06-12 VITALS
SYSTOLIC BLOOD PRESSURE: 123 MMHG | WEIGHT: 122 LBS | HEART RATE: 59 BPM | HEIGHT: 62 IN | BODY MASS INDEX: 22.45 KG/M2 | OXYGEN SATURATION: 100 % | TEMPERATURE: 98 F | DIASTOLIC BLOOD PRESSURE: 80 MMHG

## 2024-06-12 DIAGNOSIS — Z79.899 MEDICATION MANAGEMENT: ICD-10-CM

## 2024-06-12 DIAGNOSIS — R05.8 OTHER COUGH: ICD-10-CM

## 2024-06-12 DIAGNOSIS — R93.1 HIGH CORONARY ARTERY CALCIUM SCORE: ICD-10-CM

## 2024-06-12 DIAGNOSIS — Z00.00 MEDICARE ANNUAL WELLNESS VISIT, SUBSEQUENT: Primary | ICD-10-CM

## 2024-06-12 DIAGNOSIS — I10 ESSENTIAL HYPERTENSION, BENIGN: ICD-10-CM

## 2024-06-12 DIAGNOSIS — G43.109 OCULAR MIGRAINE: ICD-10-CM

## 2024-06-12 DIAGNOSIS — R53.83 OTHER FATIGUE: ICD-10-CM

## 2024-06-12 DIAGNOSIS — R53.83 FATIGUE, UNSPECIFIED TYPE: ICD-10-CM

## 2024-06-12 DIAGNOSIS — Z00.01 ENCOUNTER FOR GENERAL ADULT MEDICAL EXAMINATION WITH ABNORMAL FINDINGS: Primary | ICD-10-CM

## 2024-06-12 DIAGNOSIS — E73.9 LACTOSE INTOLERANCE, UNSPECIFIED: ICD-10-CM

## 2024-06-12 DIAGNOSIS — K21.9 GASTROESOPHAGEAL REFLUX DISEASE, UNSPECIFIED WHETHER ESOPHAGITIS PRESENT: ICD-10-CM

## 2024-06-12 DIAGNOSIS — R49.0 HOARSENESS: ICD-10-CM

## 2024-06-12 DIAGNOSIS — E78.5 DYSLIPIDEMIA: ICD-10-CM

## 2024-06-12 PROCEDURE — 3074F SYST BP LT 130 MM HG: CPT | Performed by: FAMILY MEDICINE

## 2024-06-12 PROCEDURE — 1159F MED LIST DOCD IN RCRD: CPT | Performed by: FAMILY MEDICINE

## 2024-06-12 PROCEDURE — 1036F TOBACCO NON-USER: CPT | Performed by: FAMILY MEDICINE

## 2024-06-12 PROCEDURE — 1126F AMNT PAIN NOTED NONE PRSNT: CPT | Performed by: FAMILY MEDICINE

## 2024-06-12 PROCEDURE — 3079F DIAST BP 80-89 MM HG: CPT | Performed by: FAMILY MEDICINE

## 2024-06-12 PROCEDURE — UHSPHYS PR UH SELECT PHYSICAL: Performed by: FAMILY MEDICINE

## 2024-06-12 PROCEDURE — G0439 PPPS, SUBSEQ VISIT: HCPCS | Performed by: FAMILY MEDICINE

## 2024-06-12 ASSESSMENT — LIFESTYLE VARIABLES
HOW MANY STANDARD DRINKS CONTAINING ALCOHOL DO YOU HAVE ON A TYPICAL DAY: 1 OR 2
HOW OFTEN DO YOU HAVE A DRINK CONTAINING ALCOHOL: 2-4 TIMES A MONTH
HOW OFTEN DO YOU HAVE SIX OR MORE DRINKS ON ONE OCCASION: NEVER
SKIP TO QUESTIONS 9-10: 1
AUDIT-C TOTAL SCORE: 2

## 2024-06-12 ASSESSMENT — ENCOUNTER SYMPTOMS
OCCASIONAL FEELINGS OF UNSTEADINESS: 1
DEPRESSION: 0
LOSS OF SENSATION IN FEET: 0

## 2024-06-12 ASSESSMENT — PAIN SCALES - GENERAL
PAINLEVEL: 0-NO PAIN
PAINLEVEL: 0-NO PAIN

## 2024-06-12 ASSESSMENT — PATIENT HEALTH QUESTIONNAIRE - PHQ9
1. LITTLE INTEREST OR PLEASURE IN DOING THINGS: NOT AT ALL
SUM OF ALL RESPONSES TO PHQ9 QUESTIONS 1 & 2: 0
2. FEELING DOWN, DEPRESSED OR HOPELESS: NOT AT ALL

## 2024-06-12 NOTE — PROGRESS NOTES
Subjective   Patient ID: Mckayla Michel is a 79 y.o. female who presents for Annual Exam ([facility]).  HPI  1) ongoing issues w/ hoarseness and cough  Taking esomeprazole just once daily which has helped to some degree with both occasional cough that is not overly productive  No classic heartburn per se, food not getting stuck on the way down  However she can feel some mucus more central airway higher up  No marked wheeze with this  Question if she has had some postnasal drip  She did start loratadine on her own and that seemed to do improve things but question if it gave her some sensation of symmetric headache and gestures at times over the brow and other times a bit more over the top of the head    2) question some fatigue although she is able to keep up with her exercise routine  Sleep patterns have not really changed, alcohol intake is not really changed (1 to 2 glasses up to 2 times a week)  No bruising or bleeding issues  No prior history of B12 deficiency noted    3) prior history of elevated coronary artery calcium scoring test follow-up hypertension/lipids - denies chest pain, shortness of breath, dizziness, lightheadedness, hand or foot swelling that is new or different.  Taking and tolerating medications well.  Doing well with physical activity - would like referral to cardio (Suggest Dr Patrick)    4) wonders if she may have some issues with lactose intolerance  She normally has yogurt in the afternoon hours and finds she can be quite gassy later in the day which is new  She is planning to see Dr. Anderson sometime in the near future  Did discuss trial of Lactaid    5) prevention  Immunizations: Due for Prevnar 20  Breast cancer screening: Just completed the end of last month  Colon cancer screening: Will be seeing Dr. Anderson, await [facility], last C-csope Frankel in 2018, suggest 5 yr follow-up  Skin cancer: see Dr Araiza    Review of Systems  Essentially noncontributory otherwise    Objective \  BP  "123/80 (BP Location: Left arm, Patient Position: Sitting, BP Cuff Size: Adult) Comment: 160/79, 161/89 initially  Pulse 59   Temp 36.7 °C (98 °F) (Temporal)   Ht 1.569 m (5' 1.77\")   Wt 55.3 kg (122 lb)   SpO2 100%   BMI 22.48 kg/m²       Physical Exam  Vitals and nursing note reviewed.   Constitutional:       General: She is not in acute distress.     Appearance: She is not ill-appearing.   HENT:      Head: Normocephalic and atraumatic.      Right Ear: Tympanic membrane normal.      Left Ear: Tympanic membrane normal.      Nose: Nose normal.      Comments: No marked bogginess     Mouth/Throat:      Mouth: Mucous membranes are moist.      Pharynx: Oropharynx is clear. No posterior oropharyngeal erythema.      Comments: No lymphoid hyperplasia  Eyes:      General: No scleral icterus.     Extraocular Movements: Extraocular movements intact.   Cardiovascular:      Rate and Rhythm: Normal rate and regular rhythm.      Heart sounds: No murmur heard.  Pulmonary:      Breath sounds: Rhonchi present. No wheezing.   Chest:      Chest wall: Tenderness present.   Abdominal:      General: There is no distension.      Palpations: Abdomen is soft.      Tenderness: There is no abdominal tenderness.   Musculoskeletal:         General: Normal range of motion.      Cervical back: Normal range of motion.      Right lower leg: No edema.      Left lower leg: No edema.   Lymphadenopathy:      Cervical: No cervical adenopathy.   Skin:     General: Skin is warm and dry.   Neurological:      Mental Status: She is alert and oriented to person, place, and time. Mental status is at baseline.      Motor: No weakness.      Coordination: Coordination normal.      Deep Tendon Reflexes: Reflexes normal.   Psychiatric:         Mood and Affect: Mood normal.         Behavior: Behavior normal.         Thought Content: Thought content normal.         Judgment: Judgment normal.         Assessment/Plan   Problem List Items Addressed This Visit       " Gastroesophageal reflux disease    Dyslipidemia    Relevant Orders    Referral to Cardiology    Essential hypertension, benign    Relevant Orders    Referral to Cardiology     Other Visit Diagnoses       Encounter for general adult medical examination with abnormal findings    -  Primary    Hoarseness        Other cough        Fatigue, unspecified type        Ocular migraine        Lactose intolerance, unspecified        High coronary artery calcium score        Relevant Orders    Referral to Cardiology    Medication management        Relevant Medications    calcium-chondr-collag-glycosam 300 mg calcium- 1,700 mg/scoop powder        1) ongoing issues w/ hoarseness and cough - in light of upper airway coarse sounds will do trial of more potent H1B (Xyzal) at hs x 1 week - then consider trial off of Nexium - also will see Dr Anderson (poss EGD)     2)  fatigue -see with switch of antihistamine to nighttime yields, see what happens when she does try to go off the Nexium, also check CBC, B12 and TSH    3) some concerns about better understanding her cardiovascular risk, will refer to Dr. Patrick    4) ? lactose intolerance - trial of Lactaid    5) Await labs

## 2024-06-12 NOTE — PROGRESS NOTES
Mckayla Michel is a 79 year old here for a Medicare Annual Wellness Visit     Health Risk Assessment  In general, health is:  good     Concerns with balance:  yes, but is keeping up with activity      Concerns with teeth or dentures:  no      Concerns with sexual function:  no     Felt anxious, stressed, angry, irritable, lonely, isolated, or had thoughts of hurting themself:  to some degree      Has little interest or pleasure in doing things:  no     Bothered by feeling down, depressed, or hopeless:  no     Needs help with grocery shopping, cooking, housework, bathing, grooming, dressing, eating, sitting or standing, walking, using the toilet, handling finances, taking medications, using the telephone, or driving:  no      Following safety precautions in the home environment and vehicle: removed throw rugs from floors, installed grab bars in the bathroom, handrails in stairwells, having adequate lighting, wearing seatbelt at all times?:  yes     Smokes cigarettes, vapes, or chew tobacco:  no     Eats healthy foods including fruits, vegetables, whole grains, and fiber-rich foods:  yes     Number of days per week engages in exercise:  daily     Average alcohol consumption: 1-2 glasses 2x/week        Current Providers  Specialists: I have reviewed specialist-related care of the patient in the medical record.   Urol (Charlotte)   Ophtho (Alexi)  Derm (Jose G)  DDS (Zeinab)  Medical/Family history review  Reviewed and updated problem list, medical/surgical/family/social history, medications, and allergies.     Opioid use review  Patient use of opioids:  0           Depression screening  Depression Screening PHQ-2 Score   Today 0         Depression screening tool completed and reviewed. Based on score and interview, patient is not at risk for depression. Screening tool discussed with patient, and I recommended no further intervention at this time.     Cognitive screening  Mini Cog Score:  5/5     Cognitive  "screening reviewed and plan:  not indicated      Functional Observation  Was the patient's timed Up & Go test unsteady or >= 12 seconds?  no     Advance Care Planning  End of Life planning discussed, including patient's advanced directive wishes:  yes    Vision and Hearing assessment  Visual acuity (required for Welcome to Medicare):  past Winter   Hearing Evaluation:  does not have trouble other than at restaurants     ====================================================    /80 (BP Location: Left arm, Patient Position: Sitting, BP Cuff Size: Adult)   Pulse 59   Temp 36.7 °C (98 °F) (Temporal)   Ht 1.569 m (5' 1.77\")   Wt 55.3 kg (122 lb)   SpO2 100%   BMI 22.48 kg/m²     Chief Complaint   Patient presents with    Medicare Annual Wellness Visit Subsequent       HPI  1) ongoing issues w/ hoarseness and cough  Taking esomeprazole just once daily which has helped to some degree with both occasional cough that is not overly productive  No classic heartburn per se, food not getting stuck on the way down  However she can feel some mucus more central airway higher up  No marked wheeze with this  Question if she has had some postnasal drip  She did start loratadine on her own and that seemed to do improve things but question if it gave her some sensation of symmetric headache and gestures at times over the brow and other times a bit more over the top of the head    2) question some fatigue although she is able to keep up with her exercise routine  Sleep patterns have not really changed, alcohol intake is not really changed (1 to 2 glasses up to 2 times a week)  No bruising or bleeding issues  No prior history of B12 deficiency noted    3) prior history of elevated coronary artery calcium scoring test follow-up hypertension/lipids - denies chest pain, shortness of breath, dizziness, lightheadedness, hand or foot swelling that is new or different.  Taking and tolerating medications well.  Doing well with physical " "activity - would like referral to cardio (Suggest Dr Patrick)    4) wonders if she may have some issues with lactose intolerance  She normally has yogurt in the afternoon hours and finds she can be quite gassy later in the day which is new  She is planning to see Dr. Anderson sometime in the near future  Did discuss trial of Lactaid    5) prevention  Immunizations: Due for Prevnar 20  Breast cancer screening: Just completed the end of last month  Colon cancer screening: Will be seeing Dr. Anderson, await CBC, last C-csope Frankel in 2018, suggest 5 yr follow-up  Skin cancer: see Dr Araiza    Review of Systems  Essentially noncontributory otherwise    Objective \  /80 (BP Location: Left arm, Patient Position: Sitting, BP Cuff Size: Adult) Comment: 160/79, 161/89 initially  Pulse 59   Temp 36.7 °C (98 °F) (Temporal)   Ht 1.569 m (5' 1.77\")   Wt 55.3 kg (122 lb)   SpO2 100%   BMI 22.48 kg/m²       Physical Exam  Vitals and nursing note reviewed.   Constitutional:       General: She is not in acute distress.     Appearance: She is not ill-appearing.   HENT:      Head: Normocephalic and atraumatic.      Right Ear: Tympanic membrane normal.      Left Ear: Tympanic membrane normal.      Nose: Nose normal.      Comments: No marked bogginess     Mouth/Throat:      Mouth: Mucous membranes are moist.      Pharynx: Oropharynx is clear. No posterior oropharyngeal erythema.      Comments: No lymphoid hyperplasia  Eyes:      General: No scleral icterus.     Extraocular Movements: Extraocular movements intact.   Cardiovascular:      Rate and Rhythm: Normal rate and regular rhythm.      Heart sounds: No murmur heard.  Pulmonary:      Breath sounds: Rhonchi present. No wheezing.   Chest:      Chest wall: Tenderness present.   Abdominal:      General: There is no distension.      Palpations: Abdomen is soft.      Tenderness: There is no abdominal tenderness.   Musculoskeletal:         General: Normal range of motion.    "   Cervical back: Normal range of motion.      Right lower leg: No edema.      Left lower leg: No edema.   Lymphadenopathy:      Cervical: No cervical adenopathy.   Skin:     General: Skin is warm and dry.   Neurological:      Mental Status: She is alert and oriented to person, place, and time. Mental status is at baseline.      Motor: No weakness.      Coordination: Coordination normal.      Deep Tendon Reflexes: Reflexes normal.   Psychiatric:         Mood and Affect: Mood normal.         Behavior: Behavior normal.         Thought Content: Thought content normal.         Judgment: Judgment normal.         Assessment/Plan   Problem List Items Addressed This Visit       Gastroesophageal reflux disease    Dyslipidemia    Relevant Orders    Referral to Cardiology    Essential hypertension, benign    Relevant Orders    Referral to Cardiology     Other Visit Diagnoses       Encounter for general adult medical examination with abnormal findings    -  Primary    Hoarseness        Other cough        Fatigue, unspecified type        Ocular migraine        Lactose intolerance, unspecified        High coronary artery calcium score        Relevant Orders    Referral to Cardiology    Medication management        Relevant Medications    calcium-chondr-collag-glycosam 300 mg calcium- 1,700 mg/scoop powder        1) ongoing issues w/ hoarseness and cough - in light of upper airway coarse sounds will do trial of more potent H1B (Xyzal) at  x 1 week - then consider trial off of Nexium - also will see Dr Anderson (poss EGD)     2)  fatigue -see with switch of antihistamine to nighttime yields, see what happens when she does try to go off the Nexium, also check CBC, B12 and TSH    3) some concerns about better understanding her cardiovascular risk, will refer to Dr. Patrick    4) ? lactose intolerance - trial of Lactaid    5) Await labs

## 2024-06-14 ENCOUNTER — LAB (OUTPATIENT)
Dept: LAB | Facility: LAB | Age: 79
End: 2024-06-14
Payer: MEDICARE

## 2024-06-14 DIAGNOSIS — Z00.00 HEALTHCARE MAINTENANCE: ICD-10-CM

## 2024-06-14 DIAGNOSIS — R53.83 OTHER FATIGUE: ICD-10-CM

## 2024-06-14 LAB
25(OH)D3 SERPL-MCNC: 40 NG/ML (ref 30–100)
ALBUMIN SERPL BCP-MCNC: 4.4 G/DL (ref 3.4–5)
ALP SERPL-CCNC: 69 U/L (ref 33–136)
ALT SERPL W P-5'-P-CCNC: 16 U/L (ref 7–45)
ANION GAP SERPL CALC-SCNC: 13 MMOL/L (ref 10–20)
AST SERPL W P-5'-P-CCNC: 20 U/L (ref 9–39)
BASOPHILS # BLD AUTO: 0.03 X10*3/UL (ref 0–0.1)
BASOPHILS NFR BLD AUTO: 0.6 %
BILIRUB SERPL-MCNC: 0.5 MG/DL (ref 0–1.2)
BUN SERPL-MCNC: 24 MG/DL (ref 6–23)
CALCIUM SERPL-MCNC: 10 MG/DL (ref 8.6–10.6)
CHLORIDE SERPL-SCNC: 100 MMOL/L (ref 98–107)
CHOLEST SERPL-MCNC: 184 MG/DL (ref 0–199)
CHOLESTEROL/HDL RATIO: 1.9
CO2 SERPL-SCNC: 30 MMOL/L (ref 21–32)
CREAT SERPL-MCNC: 0.74 MG/DL (ref 0.5–1.05)
CRP SERPL HS-MCNC: 0.8 MG/L
EGFRCR SERPLBLD CKD-EPI 2021: 83 ML/MIN/1.73M*2
EOSINOPHIL # BLD AUTO: 0.24 X10*3/UL (ref 0–0.4)
EOSINOPHIL NFR BLD AUTO: 4.7 %
ERYTHROCYTE [DISTWIDTH] IN BLOOD BY AUTOMATED COUNT: 12.7 % (ref 11.5–14.5)
EST. AVERAGE GLUCOSE BLD GHB EST-MCNC: 111 MG/DL
GLUCOSE SERPL-MCNC: 87 MG/DL (ref 74–99)
HBA1C MFR BLD: 5.5 %
HCT VFR BLD AUTO: 40.3 % (ref 36–46)
HDLC SERPL-MCNC: 95.1 MG/DL
HGB BLD-MCNC: 12.7 G/DL (ref 12–16)
IMM GRANULOCYTES # BLD AUTO: 0.01 X10*3/UL (ref 0–0.5)
IMM GRANULOCYTES NFR BLD AUTO: 0.2 % (ref 0–0.9)
LDLC SERPL CALC-MCNC: 72 MG/DL
LYMPHOCYTES # BLD AUTO: 1.79 X10*3/UL (ref 0.8–3)
LYMPHOCYTES NFR BLD AUTO: 34.8 %
MCH RBC QN AUTO: 28.9 PG (ref 26–34)
MCHC RBC AUTO-ENTMCNC: 31.5 G/DL (ref 32–36)
MCV RBC AUTO: 92 FL (ref 80–100)
MONOCYTES # BLD AUTO: 0.8 X10*3/UL (ref 0.05–0.8)
MONOCYTES NFR BLD AUTO: 15.5 %
NEUTROPHILS # BLD AUTO: 2.28 X10*3/UL (ref 1.6–5.5)
NEUTROPHILS NFR BLD AUTO: 44.2 %
NON HDL CHOLESTEROL: 89 MG/DL (ref 0–149)
NRBC BLD-RTO: 0 /100 WBCS (ref 0–0)
PLATELET # BLD AUTO: 255 X10*3/UL (ref 150–450)
POTASSIUM SERPL-SCNC: 4.6 MMOL/L (ref 3.5–5.3)
PROT SERPL-MCNC: 6.6 G/DL (ref 6.4–8.2)
RBC # BLD AUTO: 4.39 X10*6/UL (ref 4–5.2)
SODIUM SERPL-SCNC: 138 MMOL/L (ref 136–145)
TRIGL SERPL-MCNC: 83 MG/DL (ref 0–149)
TSH SERPL-ACNC: 2.53 MIU/L (ref 0.44–3.98)
VIT B12 SERPL-MCNC: 655 PG/ML (ref 211–911)
VLDL: 17 MG/DL (ref 0–40)
WBC # BLD AUTO: 5.2 X10*3/UL (ref 4.4–11.3)

## 2024-06-14 PROCEDURE — 36415 COLL VENOUS BLD VENIPUNCTURE: CPT

## 2024-06-14 PROCEDURE — 84443 ASSAY THYROID STIM HORMONE: CPT

## 2024-06-14 PROCEDURE — 86141 C-REACTIVE PROTEIN HS: CPT

## 2024-06-14 PROCEDURE — 85025 COMPLETE CBC W/AUTO DIFF WBC: CPT

## 2024-06-14 PROCEDURE — 82607 VITAMIN B-12: CPT

## 2024-06-14 PROCEDURE — 80061 LIPID PANEL: CPT

## 2024-06-14 PROCEDURE — 80053 COMPREHEN METABOLIC PANEL: CPT

## 2024-06-14 PROCEDURE — 82306 VITAMIN D 25 HYDROXY: CPT

## 2024-06-14 PROCEDURE — 83036 HEMOGLOBIN GLYCOSYLATED A1C: CPT

## 2024-06-24 ENCOUNTER — TELEPHONE (OUTPATIENT)
Dept: PRIMARY CARE | Facility: CLINIC | Age: 79
End: 2024-06-24
Payer: MEDICARE

## 2024-06-24 NOTE — TELEPHONE ENCOUNTER
1)  Just had a physical - she read over her summary and saw it was noted she could cut back on some medications - she wants to confirm with you as she does not recall this being discussed.    2)  Was going to try and wean off of a few meds - the night time esomeprazole.  She did do this and had a lot of heartburn.  She took Famotidine the last two days - helped one of two days.  This AM she went back on Omeprazole (in the AM).    Was on it for too long and very worried about long term side effects.     Would like return call to further discuss.

## 2024-06-26 NOTE — TELEPHONE ENCOUNTER
Called back and clarified with patient   1) the medication/supplements she was talking about was not really reduced by me it was more so an update based on her report to Ariela as to how she was taking it currently    2) sought to reassure her that she is better off just treating her reflux with the best medication which appears to be esomeprazole at present - follow - also will have appt with Dr Anderson to discuss optimal treatment

## 2024-07-09 ENCOUNTER — APPOINTMENT (OUTPATIENT)
Dept: GASTROENTEROLOGY | Facility: EXTERNAL LOCATION | Age: 79
End: 2024-07-09
Payer: MEDICARE

## 2024-07-09 DIAGNOSIS — K21.9 GASTRO-ESOPHAGEAL REFLUX DISEASE WITHOUT ESOPHAGITIS: ICD-10-CM

## 2024-07-09 DIAGNOSIS — J31.2 CHRONIC PHARYNGITIS: ICD-10-CM

## 2024-07-09 DIAGNOSIS — R12 HEARTBURN: Primary | ICD-10-CM

## 2024-07-09 DIAGNOSIS — K29.00 OTHER ACUTE GASTRITIS WITHOUT HEMORRHAGE: ICD-10-CM

## 2024-07-09 PROCEDURE — 88305 TISSUE EXAM BY PATHOLOGIST: CPT

## 2024-07-09 PROCEDURE — 0753T DGTZ GLS MCRSCP SLD LEVEL IV: CPT

## 2024-07-09 PROCEDURE — 43239 EGD BIOPSY SINGLE/MULTIPLE: CPT | Performed by: INTERNAL MEDICINE

## 2024-07-10 ENCOUNTER — LAB REQUISITION (OUTPATIENT)
Dept: LAB | Facility: HOSPITAL | Age: 79
End: 2024-07-10
Payer: MEDICARE

## 2024-07-22 LAB
LABORATORY COMMENT REPORT: NORMAL
PATH REPORT.FINAL DX SPEC: NORMAL
PATH REPORT.GROSS SPEC: NORMAL
PATH REPORT.RELEVANT HX SPEC: NORMAL
PATH REPORT.TOTAL CANCER: NORMAL

## 2024-07-27 NOTE — PROGRESS NOTES
[facility] [place]       Primary Care Physician: Luis E Plunkett MD  Date of Visit: 07/29/2024  9:00 AM EDT     HPI / Summary:   Mckayla Michel is a 79 y.o. female with a history of hypertension, hyperlipidemia, elevated coronary artery calcium score (reportedly 540) and bladder cancer (s/p radical cystectomy) who was referred for cardiovascular evaluation.    She has a family history of heart disease in both parents a had a CAC of 540 (she thinks around 2010, when she met her partner). She was initially put on Niacin. She exercises every day. She feels very well and has no chest pressure, pain or dyspnea on exertion. She is going to [place] soon. She has had left worse than right distal left lower extremity swelling since her 40s. No orthopnea. She has had issues with GERD in the past and has trialed several PPI and H2 blockers and doses.     ROS: Relevant review of symptoms of negative unless discussed above.     Problems:   Patient Active Problem List   Diagnosis    Acquired hallux valgus of both feet    Contusion of right foot    Foot pain, right    Gastroesophageal reflux disease    Metatarsalgia of right foot    Malignant neoplasm of urinary bladder (Multi)    Dyslipidemia    Essential hypertension, benign    Family history of coronary artery disease       Medical History:   Past Medical History:   Diagnosis Date    Arthritis     Hypertension     Urinary system disease        Surgical Hx:   Past Surgical History:   Procedure Laterality Date    COLONOSCOPY  07/26/2018    Dr Frankel - rec 5 yr follow-up (has seen Monica)    ESOPHAGOGASTRODUODENOSCOPY  07/26/2018    Michael Frankel    HYSTERECTOMY  11/20/2017    Hysterectomy    OTHER SURGICAL HISTORY  2021    Urinary bladder excision    TONSILLECTOMY          Family Hx:   Family History   Problem Relation Name Age of Onset    Colon cancer Mother      Coronary artery disease Mother      Bipolar disorder Mother      Coronary  "artery disease Father      Kidney nephrosis Father      Osteoarthritis Sister Kaelyn     Other (Bowel) Sister Kaelyn     Alcohol abuse Sister Kaelyn     Osteoarthritis Sister Phuong     Kidney disease Sister Phuong     Colon cancer Maternal Grandmother      Other (Lyme Disease) Daughter Yudith     Other (POTS) Daughter Yudith     Lupus Daughter Yudith     Obesity Son Guero     Allergies Other      Heart disease Other      Hearing loss Other      Hypertension Other       Father  of a \"burst aorta\" after having an MI in age 47 (in ). Had angina for 10 years prior to that. Was overweight. Smoker.   Mother  at age 73 in [place] in her sleep. Smoked. HTN. Manic depression 5 years before death, was on lithium.   Older sister is 4.5 years older - gastric surgeries, rectal lift, knee replacements, HTN  Younger sister is 8 years younger - \"claims to be fine\".   Kids  Guero Michel (b ) - lives in [place],  with three kids. Healthy.   Yudith Saxena (b ) - has one daughter, live here.  to [facility]. Lyme disease, POTS. Thyroidectomy.     of prostate cancer.     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     Medications  Current Outpatient Medications   Medication Instructions    Bifidobacterium infantis (Align) 4 mg capsule 1 capsule, oral, Every morning    calcium carbonate-vitamin D3 (Calcium 600 with Vitamin D3) 600 mg-10 mcg (400 unit) chewable tablet Calcium 600/Vitamin D 600-400 MG-UNIT Oral Tablet Chewable   Refills: 0       Active    calcium-chondr-collag-glycosam 300 mg calcium- 1,700 mg/scoop powder 1 Dose, oral, Daily    EPINEPHrine 0.3 mg/0.3 mL " "injection syringe EPINEPHrine 0.3 MG/0.3ML Injection Solution Auto-injector   Quantity: 2  Refills: 0        Start : 25-Mar-2021   Active    esomeprazole (NEXIUM) 20 mg, oral, Daily before breakfast, Do not open capsule.    losartan (COZAAR) 25 mg, oral, Daily    magnesium glycinate 100 mg magnesium capsule 2 capsules, oral, Daily    rosuvastatin (Crestor) 10 mg tablet Take 1 tablet nightly    traZODone (DESYREL) 25-50 mg, oral, Nightly    triamcinolone (Kenalog) 0.1 % cream use as directed three times a day if needed    ubidecarenone (COENZYME Q10, BULK, MISC) oral       Allergies  Bee venom protein (honey bee), Penicillins, Pollen extracts, and Sulfa (sulfonamide antibiotics)    Exam:   Vitals: /75   Pulse 57   Resp 18   Ht 1.588 m (5' 2.5\")   Wt 55.9 kg (123 lb 4.8 oz)   SpO2 97%   BMI 22.19 kg/m²   Wt Readings from Last 5 Encounters:   07/29/24 55.9 kg (123 lb 4.8 oz)   06/12/24 55.3 kg (122 lb)   06/12/24 55.3 kg (122 lb)   11/21/23 55.8 kg (123 lb)   10/19/23 55.8 kg (123 lb)     GEN: Pleasant, well-appearing, no acute distress.  HEENT: JVP not elevated  CHEST: Clear to auscultation, No wheeze, good air movement.  CV: normal rate, regular rhythm with premature beats, no murmurs at rest or with handgrip  ABD: Soft  EXT: Warm, well perfused, 1+ left lower extremity edema  NEURO: grossly non focal  SKIN: No obvious rashes     Labs:   Lipids  Lab Results   Component Value Date    CHOL 184 06/14/2024    CHOL 183 06/01/2023     Lab Results   Component Value Date    HDL 95.1 06/14/2024    HDL 87.1 06/01/2023     Lab Results   Component Value Date    LDLCALC 72 06/14/2024     Lab Results   Component Value Date    TRIG 83 06/14/2024    TRIG 82 06/01/2023     No components found for: \"CHOLHDL\"    Hemoglobin A1C  Lab Results   Component Value Date    HGBA1C 5.5 06/14/2024       BMP  Lab Results   Component Value Date    GLUCOSE 87 06/14/2024    CALCIUM 10.0 06/14/2024     06/14/2024    K 4.6 06/14/2024 "    CO2 30 06/14/2024     06/14/2024    BUN 24 (H) 06/14/2024    CREATININE 0.74 06/14/2024         Notable Studies: imaging personally reviewed and summarized by me below  EKG:  -7/29/2024: sinus bradycardia, HR 57 with sinus arrhythmia, normal R wave progression, no ST changes    Echo:  -5/28/2019 at [facility]: LVIDD 3.8 cm, LVEF 65%, IVS 1.0 cm, posterior wall, 0.8 cm, normal diastolic function, trace MR due to mild posterior prolapse.     Ambulatory Rhythm Monitor:   -2019 at [facility]: 14-day monitor which showed predominantly sinus rhythm and a few short episodes of SVT up to 8 beats.  No other concerning findings.    Cardiac CT:  -CAC: Reportedly 540 around 2010    Assessment and Plan  Mckayla Michel is a 79 y.o. female with a history of hypertension, hyperlipidemia, elevated coronary artery calcium score (reportedly 540) and bladder cancer (s/p radical cystectomy) who was referred for cardiovascular evaluation.    High calcium score: discussed risk factors including genetics (both parents seem to have had heart disease of some sort), HTN and HLD. Her cholesterol is well controlled. We discussed data from Budoff et al suggesting that CAC >300 confers a similar risk as individuals with a prior cardiac event. As such, through shared decision making we decided to initiate aspirin 81 mg daily. She does bruise easily but doesn't bleed easily. If she were to have intolerable bleeding or bruising, she can stop the aspirin. Continue rosuvastatin. Continue daily exercise. She has no signs or symptoms suggestive of ischemia or heart failure.  HTN: well controlled. Continue losartan.   HLD: rosuvastatin as above.   Reportedly trace/mild mitral regurgitation on 2019 echo: no images available but by physical exam there is no significant murmur at rest or with handgrip to suggest more than mild mitral regurgitation. No absolute indication for repeat echocardiogram now.     Follow up in 1 year for further evaluation.      Avelino  MD Celina  Director, Sports Cardiology  [facility]    Part of this note was completed using dictation and voice recognition software. Please excuse minor errors and typos.     Time Spent: I spent 45 minutes reviewing medical testing, obtaining medical history and counselling and educating on diagnosis and documenting clinical encounter.

## 2024-07-29 ENCOUNTER — OFFICE VISIT (OUTPATIENT)
Dept: CARDIOLOGY | Facility: HOSPITAL | Age: 79
End: 2024-07-29
Payer: MEDICARE

## 2024-07-29 VITALS
OXYGEN SATURATION: 97 % | WEIGHT: 123.3 LBS | HEIGHT: 63 IN | RESPIRATION RATE: 18 BRPM | DIASTOLIC BLOOD PRESSURE: 75 MMHG | BODY MASS INDEX: 21.85 KG/M2 | SYSTOLIC BLOOD PRESSURE: 123 MMHG | HEART RATE: 57 BPM

## 2024-07-29 DIAGNOSIS — E78.5 DYSLIPIDEMIA: Primary | ICD-10-CM

## 2024-07-29 DIAGNOSIS — R93.1 HIGH CORONARY ARTERY CALCIUM SCORE: ICD-10-CM

## 2024-07-29 DIAGNOSIS — I10 ESSENTIAL HYPERTENSION, BENIGN: ICD-10-CM

## 2024-07-29 DIAGNOSIS — K21.9 GASTROESOPHAGEAL REFLUX DISEASE, UNSPECIFIED WHETHER ESOPHAGITIS PRESENT: ICD-10-CM

## 2024-07-29 LAB
ATRIAL RATE: 57 BPM
P AXIS: 73 DEGREES
P OFFSET: 201 MS
P ONSET: 147 MS
PR INTERVAL: 150 MS
Q ONSET: 222 MS
QRS COUNT: 10 BEATS
QRS DURATION: 78 MS
QT INTERVAL: 404 MS
QTC CALCULATION(BAZETT): 393 MS
QTC FREDERICIA: 397 MS
R AXIS: 60 DEGREES
T AXIS: 47 DEGREES
T OFFSET: 424 MS
VENTRICULAR RATE: 57 BPM

## 2024-07-29 PROCEDURE — 93005 ELECTROCARDIOGRAM TRACING: CPT | Performed by: INTERNAL MEDICINE

## 2024-07-29 PROCEDURE — 99214 OFFICE O/P EST MOD 30 MIN: CPT | Performed by: INTERNAL MEDICINE

## 2024-07-29 PROCEDURE — 93010 ELECTROCARDIOGRAM REPORT: CPT | Performed by: INTERNAL MEDICINE

## 2024-07-29 PROCEDURE — 3078F DIAST BP <80 MM HG: CPT | Performed by: INTERNAL MEDICINE

## 2024-07-29 PROCEDURE — 1036F TOBACCO NON-USER: CPT | Performed by: INTERNAL MEDICINE

## 2024-07-29 PROCEDURE — 3074F SYST BP LT 130 MM HG: CPT | Performed by: INTERNAL MEDICINE

## 2024-07-29 PROCEDURE — 1159F MED LIST DOCD IN RCRD: CPT | Performed by: INTERNAL MEDICINE

## 2024-07-29 PROCEDURE — G2211 COMPLEX E/M VISIT ADD ON: HCPCS | Performed by: INTERNAL MEDICINE

## 2024-07-29 RX ORDER — NAPROXEN SODIUM 220 MG/1
81 TABLET, FILM COATED ORAL DAILY
Qty: 90 TABLET | Refills: 3 | Status: SHIPPED | OUTPATIENT
Start: 2024-07-29 | End: 2025-07-29

## 2024-07-29 RX ORDER — HYDROGEN PEROXIDE 3 %
20 SOLUTION, NON-ORAL MISCELLANEOUS
Start: 2024-07-29

## 2024-07-29 ASSESSMENT — ENCOUNTER SYMPTOMS
DEPRESSION: 0
OCCASIONAL FEELINGS OF UNSTEADINESS: 0
LOSS OF SENSATION IN FEET: 0

## 2024-07-29 NOTE — Clinical Note
Salomón Kimbrough - thanks for referring Mckayla. Debbie woman! We discussed aspirin 81 mg given her very high CAC score and family history of heart disease. We will try it and as long as no issues with bleeding or bruising then she will continue. Thanks - Chuy

## 2024-08-23 DIAGNOSIS — E78.5 DYSLIPIDEMIA: ICD-10-CM

## 2024-08-24 RX ORDER — ROSUVASTATIN CALCIUM 10 MG/1
TABLET, COATED ORAL
Qty: 90 TABLET | Refills: 3 | Status: SHIPPED | OUTPATIENT
Start: 2024-08-24

## 2024-09-04 ENCOUNTER — TELEPHONE (OUTPATIENT)
Dept: PRIMARY CARE | Facility: CLINIC | Age: 79
End: 2024-09-04
Payer: MEDICARE

## 2024-09-04 NOTE — TELEPHONE ENCOUNTER
Pt states that she is scheduled for her COVID booster later this week as she will be traveling to [place].   She wants to make sure that it would be okay for her to have her COVID boosters before going.  She last had her booster in May of 2024.  She also had a Prevnar injection last week.  She just wanted to make sure that it was safe to proceed.     Per discussion with Dr. Plunkett - she was advised it is safe to move forward.

## 2024-09-25 ENCOUNTER — TELEPHONE (OUTPATIENT)
Dept: PRIMARY CARE | Facility: CLINIC | Age: 79
End: 2024-09-25
Payer: MEDICARE

## 2024-09-25 DIAGNOSIS — J40 BRONCHITIS: Primary | ICD-10-CM

## 2024-09-25 RX ORDER — LEVOFLOXACIN 500 MG/1
500 TABLET, FILM COATED ORAL DAILY
Qty: 10 TABLET | Refills: 0 | Status: SHIPPED | OUTPATIENT
Start: 2024-09-25 | End: 2024-10-05

## 2024-09-25 NOTE — TELEPHONE ENCOUNTER
Pt began not feeling well on Sunday - notes she had a sore throat.  Cold symptoms followed.    She has had a cough for 2 weeks but it really has ramped up the last few days and is a deep and painful cough in her chest.  She is occasionally bringing up yellow mucus.  She denies fever, chills, sweats at this time but had been experiencing initially.  She took a home COVID test on Monday that was negative.  She repeated home COVID test today, also negative.  Recently had COVID booster on 9/8/24.  She is looking for some guidance on what to do for cough.

## 2024-09-25 NOTE — TELEPHONE ENCOUNTER
Just returned from [place].  She has been coughing for the past couple of weeks.  Starting on the 9th of September she was chilled.  She took a z pack that she had.  Helped until about 5 days ago when  She started to feel chilled again with ST.   had persistent cough.  Productive phelgm . Covid test neg x 2.   She has had a recurrent bronchitis and recurrent cough over this past year after a covid infection.  Will cover with 7 to 10 day course of Levaquin.  She has a cough syrup with codeine she can take at night.  Counseled her to call for any concerns or no improvement

## 2024-10-08 DIAGNOSIS — G47.00 INSOMNIA, UNSPECIFIED TYPE: ICD-10-CM

## 2024-10-09 RX ORDER — TRAZODONE HYDROCHLORIDE 50 MG/1
TABLET ORAL
Qty: 90 TABLET | Refills: 3 | Status: SHIPPED | OUTPATIENT
Start: 2024-10-09

## 2024-10-11 ENCOUNTER — LAB (OUTPATIENT)
Dept: LAB | Facility: LAB | Age: 79
End: 2024-10-11
Payer: MEDICARE

## 2024-10-11 ENCOUNTER — OFFICE VISIT (OUTPATIENT)
Dept: PRIMARY CARE | Facility: CLINIC | Age: 79
End: 2024-10-11
Payer: MEDICARE

## 2024-10-11 VITALS
DIASTOLIC BLOOD PRESSURE: 88 MMHG | HEART RATE: 67 BPM | OXYGEN SATURATION: 100 % | TEMPERATURE: 98.2 F | SYSTOLIC BLOOD PRESSURE: 149 MMHG

## 2024-10-11 DIAGNOSIS — R60.9 EDEMA, UNSPECIFIED TYPE: ICD-10-CM

## 2024-10-11 DIAGNOSIS — I10 ESSENTIAL HYPERTENSION, BENIGN: ICD-10-CM

## 2024-10-11 DIAGNOSIS — G25.81 RESTLESS LEGS: ICD-10-CM

## 2024-10-11 DIAGNOSIS — R53.83 OTHER FATIGUE: ICD-10-CM

## 2024-10-11 DIAGNOSIS — F45.8 OTHER SOMATOFORM DISORDERS: ICD-10-CM

## 2024-10-11 DIAGNOSIS — R60.9 EDEMA, UNSPECIFIED TYPE: Primary | ICD-10-CM

## 2024-10-11 LAB
ALBUMIN SERPL BCP-MCNC: 4.1 G/DL (ref 3.4–5)
ALP SERPL-CCNC: 71 U/L (ref 33–136)
ALT SERPL W P-5'-P-CCNC: 15 U/L (ref 7–45)
ANION GAP SERPL CALC-SCNC: 11 MMOL/L (ref 10–20)
AST SERPL W P-5'-P-CCNC: 19 U/L (ref 9–39)
BASOPHILS # BLD AUTO: 0.04 X10*3/UL (ref 0–0.1)
BASOPHILS NFR BLD AUTO: 0.6 %
BILIRUB SERPL-MCNC: 0.4 MG/DL (ref 0–1.2)
BUN SERPL-MCNC: 25 MG/DL (ref 6–23)
CALCIUM SERPL-MCNC: 9.7 MG/DL (ref 8.6–10.6)
CHLORIDE SERPL-SCNC: 102 MMOL/L (ref 98–107)
CO2 SERPL-SCNC: 29 MMOL/L (ref 21–32)
CREAT SERPL-MCNC: 0.75 MG/DL (ref 0.5–1.05)
EBV EA IGG SER QL: POSITIVE
EBV NA AB SER QL: POSITIVE
EBV VCA IGG SER IA-ACNC: POSITIVE
EBV VCA IGM SER IA-ACNC: NEGATIVE
EGFRCR SERPLBLD CKD-EPI 2021: 82 ML/MIN/1.73M*2
EOSINOPHIL # BLD AUTO: 0.16 X10*3/UL (ref 0–0.4)
EOSINOPHIL NFR BLD AUTO: 2.4 %
ERYTHROCYTE [DISTWIDTH] IN BLOOD BY AUTOMATED COUNT: 13.6 % (ref 11.5–14.5)
FERRITIN SERPL-MCNC: 63 NG/ML (ref 8–150)
GLUCOSE SERPL-MCNC: 77 MG/DL (ref 74–99)
HCT VFR BLD AUTO: 36.3 % (ref 36–46)
HGB BLD-MCNC: 11.6 G/DL (ref 12–16)
IMM GRANULOCYTES # BLD AUTO: 0.02 X10*3/UL (ref 0–0.5)
IMM GRANULOCYTES NFR BLD AUTO: 0.3 % (ref 0–0.9)
LYMPHOCYTES # BLD AUTO: 1.77 X10*3/UL (ref 0.8–3)
LYMPHOCYTES NFR BLD AUTO: 27.1 %
MCH RBC QN AUTO: 29.1 PG (ref 26–34)
MCHC RBC AUTO-ENTMCNC: 32 G/DL (ref 32–36)
MCV RBC AUTO: 91 FL (ref 80–100)
MONOCYTES # BLD AUTO: 0.91 X10*3/UL (ref 0.05–0.8)
MONOCYTES NFR BLD AUTO: 13.9 %
NEUTROPHILS # BLD AUTO: 3.64 X10*3/UL (ref 1.6–5.5)
NEUTROPHILS NFR BLD AUTO: 55.7 %
NRBC BLD-RTO: 0 /100 WBCS (ref 0–0)
PLATELET # BLD AUTO: 205 X10*3/UL (ref 150–450)
POTASSIUM SERPL-SCNC: 4.7 MMOL/L (ref 3.5–5.3)
PROT SERPL-MCNC: 6.6 G/DL (ref 6.4–8.2)
RBC # BLD AUTO: 3.98 X10*6/UL (ref 4–5.2)
SODIUM SERPL-SCNC: 137 MMOL/L (ref 136–145)
WBC # BLD AUTO: 6.5 X10*3/UL (ref 4.4–11.3)

## 2024-10-11 PROCEDURE — 1126F AMNT PAIN NOTED NONE PRSNT: CPT | Performed by: FAMILY MEDICINE

## 2024-10-11 PROCEDURE — 99215 OFFICE O/P EST HI 40 MIN: CPT | Performed by: FAMILY MEDICINE

## 2024-10-11 PROCEDURE — 80053 COMPREHEN METABOLIC PANEL: CPT

## 2024-10-11 PROCEDURE — 86663 EPSTEIN-BARR ANTIBODY: CPT

## 2024-10-11 PROCEDURE — 3077F SYST BP >= 140 MM HG: CPT | Performed by: FAMILY MEDICINE

## 2024-10-11 PROCEDURE — 86664 EPSTEIN-BARR NUCLEAR ANTIGEN: CPT

## 2024-10-11 PROCEDURE — 3079F DIAST BP 80-89 MM HG: CPT | Performed by: FAMILY MEDICINE

## 2024-10-11 PROCEDURE — 82728 ASSAY OF FERRITIN: CPT

## 2024-10-11 PROCEDURE — 86665 EPSTEIN-BARR CAPSID VCA: CPT

## 2024-10-11 PROCEDURE — 36415 COLL VENOUS BLD VENIPUNCTURE: CPT

## 2024-10-11 PROCEDURE — 1159F MED LIST DOCD IN RCRD: CPT | Performed by: FAMILY MEDICINE

## 2024-10-11 PROCEDURE — 85025 COMPLETE CBC W/AUTO DIFF WBC: CPT

## 2024-10-11 RX ORDER — TRIAMTERENE/HYDROCHLOROTHIAZID 37.5-25 MG
.5-1 TABLET ORAL DAILY PRN
Qty: 30 TABLET | Refills: 11 | Status: SHIPPED | OUTPATIENT
Start: 2024-10-11 | End: 2024-10-11 | Stop reason: SDUPTHER

## 2024-10-11 RX ORDER — TRIAMTERENE/HYDROCHLOROTHIAZID 37.5-25 MG
.5-1 TABLET ORAL DAILY PRN
Qty: 90 TABLET | Refills: 3 | Status: SHIPPED | OUTPATIENT
Start: 2024-10-11 | End: 2025-10-11

## 2024-10-11 ASSESSMENT — PAIN SCALES - GENERAL: PAINLEVEL: 0-NO PAIN

## 2024-10-11 NOTE — PROGRESS NOTES
Subjective   Patient ID: Mckayla Michel is a 79 y.o. female who presents for Follow-up (1) Post illness - fatigue, cough/2) Dry mouth from trazodone - change med?/3) Kostas leg edema, worse at night).  HPI  Issues started shortly after getting COVID vaccine and travel   More fatigue than usual - no new bruising / bleeding issues  She does report hx of restless legs - some sleep struggles   Mood not ideal - in part related to her concerns about CA hx  No new respiratory issues   Wakes up in middle of night to void and gets dry mouth which she gets from the Trazodone - otherwise no marked sleep struggles  Sig fluid retention BLEs - but no specific cardiac issues such as chest pain, shortness of breath, dizziness, lightheadedness that is new or different.  Taking and tolerating medications well.    Doing okay with physical activity but feels tired as noted    Review of Systems  Essentially noncontributory otherwise    Objective   /88 (BP Location: Left arm, Patient Position: Sitting, BP Cuff Size: Adult)   Pulse 67   Temp 36.8 °C (98.2 °F) (Temporal)   SpO2 100%     Physical Exam  Gen: NAD -but does appear mildly fatigued at least and also not her normal upbeat self  Skin: No marked rash or bruising bleeding  HEENT: No conjunctival injection or scleral icterus, throat clear and mucous membranes not excessively dry neck: No marked thyromegaly, JVD or lymphadenopathy  Lungs: Clear to auscultation bilaterally no wheeze or crackles  Cardiac: Regular rhythm, normal S1-S2 without S3-S4  Extremities: Appear well-perfused and moves all well however does have some lower extremity edema right calf slightly greater than left but left ankle greater than right in circumference  Assessment/Plan   Problem List Items Addressed This Visit       Essential hypertension, benign     Other Visit Diagnoses       Edema, unspecified type    -  Primary    Relevant Orders    CBC and Auto Differential (Completed)    Comprehensive metabolic  panel (Completed)    Other fatigue        Relevant Orders    CBC and Auto Differential (Completed)    Natacha-Barr virus VCA antibody panel (Completed)    Restless legs        Relevant Orders    Ferritin (Completed)    Other somatoform disorders        Relevant Orders    Ferritin (Completed)        1) BP / edema - trial med as ordered     2) fatigue / mood / restless legs -possibility of Natacha-Barr virus recurrence and/or ferritin issue - await labs - reassure for now    Addendum: called and reviewed blood tests   In light of Natacha-Barr virus recurrence, recommend she get a vaccine to boost immune fnxn - did have Prevnar earlier this year so suggest Tdap  Also she has not had to use the diuretic yet, but suggest she check her blood pressure at home 2 or 3 times and let us know if there is an issue as high in office  Last, slightly low ferritin and history of mild restless leg so suggest she do iron sulfate 325 mg perhaps every other day without food and see how she tolerates      TVT of 42 minutes with greater than 50% spent FTF in assessment/discussion and care coordination

## 2024-10-21 ENCOUNTER — TELEPHONE (OUTPATIENT)
Dept: PRIMARY CARE | Facility: CLINIC | Age: 79
End: 2024-10-21
Payer: MEDICARE

## 2024-10-21 NOTE — TELEPHONE ENCOUNTER
"Patient calling to let you know that she is starting to feel herself again - her BP is normal and \"life is good.\"  She is very appreciative and glad that you realized what was going on with her and the EBV.  "

## 2024-12-02 DIAGNOSIS — I10 ESSENTIAL HYPERTENSION, BENIGN: ICD-10-CM

## 2024-12-02 RX ORDER — LOSARTAN POTASSIUM 25 MG/1
25 TABLET ORAL DAILY
Qty: 90 TABLET | Refills: 3 | Status: SHIPPED | OUTPATIENT
Start: 2024-12-02

## 2025-01-08 ENCOUNTER — TELEPHONE (OUTPATIENT)
Dept: PRIMARY CARE | Facility: CLINIC | Age: 80
End: 2025-01-08
Payer: MEDICARE

## 2025-01-08 DIAGNOSIS — B27.90 EPSTEIN-BARR VIRUS INFECTION: Primary | ICD-10-CM

## 2025-01-08 RX ORDER — BUPROPION HYDROCHLORIDE 75 MG/1
75 TABLET ORAL 2 TIMES DAILY
Qty: 60 TABLET | Refills: 1 | Status: SHIPPED | OUTPATIENT
Start: 2025-01-08

## 2025-01-08 NOTE — TELEPHONE ENCOUNTER
Patient continues to struggle with fatigue from EBV.  Would like to further discuss with you some things she has read about vitamin drips etc., and if that may be helpful.

## 2025-01-09 NOTE — TELEPHONE ENCOUNTER
Had previously discussed other options for this with Dr Metzgre - had also suggested  low-dose prednisone 10 mg a day for several weeks  Low-dose Wellbutrin  Ammantidine at a very low dose     Do feel that low-dose Wellbutrin would be the best option and will start as follows  To update me in the next few weeks as to how she is doing    She did feel better starting the iron and getting the vaccination weeks ago with her energy boost his seem to taper off  She did explore looking at vitamin C infusions  I am not really convinced of the benefit from them in this setting such as hers when this 1 study I came across was about acute infections whereas hers is a recurrent infection so not really sure if she would gain substantial benefit or not    Questions if she is getting some mild nausea perhaps related to the iron she does take it with breakfast every morning, discussed taking it by itself on an empty stomach to see if that ameliorates things    We also discussed the possibility of trying to raise her vitamin D as there is some evidence that may have benefit in immune function -will hold off on making that change as well for now    Requested Prescriptions     Signed Prescriptions Disp Refills    buPROPion (Wellbutrin) 75 mg tablet 60 tablet 1     Sig: Take 1 tablet (75 mg) by mouth 2 times a day.     Authorizing Provider: BERENICE LOGAN

## 2025-01-22 ENCOUNTER — TELEPHONE (OUTPATIENT)
Dept: PRIMARY CARE | Facility: CLINIC | Age: 80
End: 2025-01-22
Payer: MEDICARE

## 2025-01-22 NOTE — TELEPHONE ENCOUNTER
Patient calling to speak with Dr. Plunkett about her wellbutrin, she is thinking she may want to withdraw but wants to know what your thoughts are.

## 2025-01-23 NOTE — TELEPHONE ENCOUNTER
Called back to discuss with patient, it seems like a full tablet may cause too much stimulation -discussed may consider doing a 3-day trial of just a half a tablet during the day - she will consider that - in any case enc to get back to us as need be - would consider possible Amantadine use - also consider simply adding Vit D to see if could benefit

## 2025-02-19 ENCOUNTER — TELEPHONE (OUTPATIENT)
Dept: PRIMARY CARE | Facility: CLINIC | Age: 80
End: 2025-02-19
Payer: MEDICARE

## 2025-02-19 DIAGNOSIS — G47.00 INSOMNIA, UNSPECIFIED TYPE: ICD-10-CM

## 2025-02-19 NOTE — TELEPHONE ENCOUNTER
Called and d/w patient - not sure this is really behaving like recurrent EBV - would recommend ok to switch back to esomeprazole and trial reduce Trazodone to 1/2 tablet - update us next week - may need further eval in office

## 2025-02-19 NOTE — TELEPHONE ENCOUNTER
"Has continued to be \"exhausted.\"  She stopped taking esomeprazole and switched to Prilosec as she read something that indicated esomeprazole could cause complete exhaustion.  She has been on Prilosec for 18 days without change.   She questions if she should continue?  Switch to famotidine?  She doesn't know what to do and hoping to speak with you.  "

## 2025-02-25 ENCOUNTER — TELEPHONE (OUTPATIENT)
Dept: PRIMARY CARE | Facility: CLINIC | Age: 80
End: 2025-02-25
Payer: MEDICARE

## 2025-02-25 DIAGNOSIS — G25.81 RESTLESS LEGS: ICD-10-CM

## 2025-02-25 DIAGNOSIS — F45.8 OTHER SOMATOFORM DISORDERS: ICD-10-CM

## 2025-02-25 DIAGNOSIS — R53.83 FATIGUE, UNSPECIFIED TYPE: Primary | ICD-10-CM

## 2025-02-25 DIAGNOSIS — B27.90 EPSTEIN-BARR VIRUS INFECTION: ICD-10-CM

## 2025-02-25 NOTE — TELEPHONE ENCOUNTER
She states that she spoke with you recently about feeling tired and  you had suggested some medication changes for her.  She is taking 1/2 trazodone and did add the esomeprazole back in.  She was able to get some sleep but is still feeling tired.  She questions if she should give more time?  Eliminate trazodone?  Should she have labs when back in [place].  Please call to discuss.

## 2025-02-27 NOTE — TELEPHONE ENCOUNTER
Called back last night and discussed - no new sleep issues - actually feeling better than the day she called - not sure this is still about her EBV - discussed possible Fe issue and could consider possible supplementation - would suggest revisit labs in when returns - enc to reach out if other setbacks

## 2025-03-21 DIAGNOSIS — K21.9 GASTROESOPHAGEAL REFLUX DISEASE, UNSPECIFIED WHETHER ESOPHAGITIS PRESENT: ICD-10-CM

## 2025-03-21 RX ORDER — HYDROGEN PEROXIDE 3 %
SOLUTION, NON-ORAL MISCELLANEOUS
Qty: 180 CAPSULE | Refills: 3 | Status: SHIPPED | OUTPATIENT
Start: 2025-03-21

## 2025-04-16 ENCOUNTER — LAB (OUTPATIENT)
Dept: LAB | Facility: HOSPITAL | Age: 80
End: 2025-04-16
Payer: MEDICARE

## 2025-04-16 DIAGNOSIS — B27.90 INFECTIOUS MONONUCLEOSIS, UNSPECIFIED WITHOUT COMPLICATION: Primary | ICD-10-CM

## 2025-04-16 LAB — FERRITIN SERPL-MCNC: 63 NG/ML (ref 8–150)

## 2025-04-16 PROCEDURE — 86665 EPSTEIN-BARR CAPSID VCA: CPT

## 2025-04-16 PROCEDURE — 86663 EPSTEIN-BARR ANTIBODY: CPT

## 2025-04-16 PROCEDURE — 82728 ASSAY OF FERRITIN: CPT

## 2025-04-16 PROCEDURE — 86664 EPSTEIN-BARR NUCLEAR ANTIGEN: CPT

## 2025-04-17 LAB
EBV EA IGG SER QL: POSITIVE
EBV NA AB SER QL: POSITIVE
EBV VCA IGG SER IA-ACNC: POSITIVE
EBV VCA IGM SER IA-ACNC: NEGATIVE

## 2025-05-06 ENCOUNTER — TELEPHONE (OUTPATIENT)
Dept: PRIMARY CARE | Facility: CLINIC | Age: 80
End: 2025-05-06
Payer: MEDICARE

## 2025-05-06 DIAGNOSIS — Z12.31 ENCOUNTER FOR SCREENING MAMMOGRAM FOR MALIGNANT NEOPLASM OF BREAST: Primary | ICD-10-CM

## 2025-05-06 NOTE — TELEPHONE ENCOUNTER
Pt received a message from [facility] that she is due for a mammogram and orders are needed to be faxed to them for 3D mammogram imaging so she can schedule.

## 2025-05-22 DIAGNOSIS — Z00.00 HEALTHCARE MAINTENANCE: ICD-10-CM

## 2025-05-30 ENCOUNTER — LAB (OUTPATIENT)
Dept: LAB | Facility: HOSPITAL | Age: 80
End: 2025-05-30
Payer: MEDICARE

## 2025-05-30 LAB
ALBUMIN SERPL BCP-MCNC: 4.2 G/DL (ref 3.4–5)
ALP SERPL-CCNC: 67 U/L (ref 33–136)
ALT SERPL W P-5'-P-CCNC: 14 U/L (ref 7–45)
ANION GAP SERPL CALC-SCNC: 13 MMOL/L (ref 10–20)
AST SERPL W P-5'-P-CCNC: 18 U/L (ref 9–39)
BASOPHILS # BLD AUTO: 0.02 X10*3/UL (ref 0–0.1)
BASOPHILS NFR BLD AUTO: 0.4 %
BILIRUB SERPL-MCNC: 0.3 MG/DL (ref 0–1.2)
BUN SERPL-MCNC: 23 MG/DL (ref 6–23)
CALCIUM SERPL-MCNC: 9.9 MG/DL (ref 8.6–10.6)
CHLORIDE SERPL-SCNC: 99 MMOL/L (ref 98–107)
CHOLEST SERPL-MCNC: 181 MG/DL (ref 0–199)
CHOLESTEROL/HDL RATIO: 2.4
CO2 SERPL-SCNC: 30 MMOL/L (ref 21–32)
CREAT SERPL-MCNC: 0.8 MG/DL (ref 0.5–1.05)
CRP SERPL HS-MCNC: 12.7 MG/L
EGFRCR SERPLBLD CKD-EPI 2021: 76 ML/MIN/1.73M*2
EOSINOPHIL # BLD AUTO: 0.16 X10*3/UL (ref 0–0.4)
EOSINOPHIL NFR BLD AUTO: 3.3 %
ERYTHROCYTE [DISTWIDTH] IN BLOOD BY AUTOMATED COUNT: 12.8 % (ref 11.5–14.5)
EST. AVERAGE GLUCOSE BLD GHB EST-MCNC: 111 MG/DL
GLUCOSE SERPL-MCNC: 91 MG/DL (ref 74–99)
HBA1C MFR BLD: 5.5 % (ref ?–5.7)
HCT VFR BLD AUTO: 39.6 % (ref 36–46)
HDLC SERPL-MCNC: 76.4 MG/DL
HGB BLD-MCNC: 12.3 G/DL (ref 12–16)
IMM GRANULOCYTES # BLD AUTO: 0.01 X10*3/UL (ref 0–0.5)
IMM GRANULOCYTES NFR BLD AUTO: 0.2 % (ref 0–0.9)
LDLC SERPL CALC-MCNC: 86 MG/DL
LYMPHOCYTES # BLD AUTO: 1.63 X10*3/UL (ref 0.8–3)
LYMPHOCYTES NFR BLD AUTO: 33.2 %
MCH RBC QN AUTO: 29.8 PG (ref 26–34)
MCHC RBC AUTO-ENTMCNC: 31.1 G/DL (ref 32–36)
MCV RBC AUTO: 96 FL (ref 80–100)
MONOCYTES # BLD AUTO: 0.83 X10*3/UL (ref 0.05–0.8)
MONOCYTES NFR BLD AUTO: 16.9 %
NEUTROPHILS # BLD AUTO: 2.26 X10*3/UL (ref 1.6–5.5)
NEUTROPHILS NFR BLD AUTO: 46 %
NON HDL CHOLESTEROL: 105 MG/DL (ref 0–149)
NRBC BLD-RTO: 0 /100 WBCS (ref 0–0)
PLATELET # BLD AUTO: 206 X10*3/UL (ref 150–450)
POTASSIUM SERPL-SCNC: 4.1 MMOL/L (ref 3.5–5.3)
PROT SERPL-MCNC: 6.4 G/DL (ref 6.4–8.2)
RBC # BLD AUTO: 4.13 X10*6/UL (ref 4–5.2)
SODIUM SERPL-SCNC: 138 MMOL/L (ref 136–145)
TRIGL SERPL-MCNC: 94 MG/DL (ref 0–149)
VLDL: 19 MG/DL (ref 0–40)
WBC # BLD AUTO: 4.9 X10*3/UL (ref 4.4–11.3)

## 2025-05-30 PROCEDURE — 80061 LIPID PANEL: CPT

## 2025-05-30 PROCEDURE — 84443 ASSAY THYROID STIM HORMONE: CPT

## 2025-05-30 PROCEDURE — 82306 VITAMIN D 25 HYDROXY: CPT

## 2025-05-30 PROCEDURE — 86141 C-REACTIVE PROTEIN HS: CPT

## 2025-05-30 PROCEDURE — 83036 HEMOGLOBIN GLYCOSYLATED A1C: CPT

## 2025-05-30 PROCEDURE — 80053 COMPREHEN METABOLIC PANEL: CPT

## 2025-05-30 PROCEDURE — 85025 COMPLETE CBC W/AUTO DIFF WBC: CPT

## 2025-05-31 LAB
25(OH)D3 SERPL-MCNC: 49 NG/ML (ref 30–100)
TSH SERPL-ACNC: 1.88 MIU/L (ref 0.44–3.98)

## 2025-06-03 ASSESSMENT — PROMIS GLOBAL HEALTH SCALE
CARRYOUT_SOCIAL_ACTIVITIES: VERY GOOD
RATE_AVERAGE_PAIN: 3
RATE_QUALITY_OF_LIFE: VERY GOOD
RATE_MENTAL_HEALTH: VERY GOOD
RATE_AVERAGE_FATIGUE: MODERATE
RATE_GENERAL_HEALTH: VERY GOOD
CARRYOUT_PHYSICAL_ACTIVITIES: MOSTLY
RATE_SOCIAL_SATISFACTION: EXCELLENT
RATE_PHYSICAL_HEALTH: VERY GOOD
EMOTIONAL_PROBLEMS: SOMETIMES

## 2025-06-04 ENCOUNTER — OFFICE VISIT (OUTPATIENT)
Dept: PRIMARY CARE | Facility: CLINIC | Age: 80
End: 2025-06-04
Payer: MEDICARE

## 2025-06-04 ENCOUNTER — APPOINTMENT (OUTPATIENT)
Dept: PRIMARY CARE | Facility: CLINIC | Age: 80
End: 2025-06-04
Payer: MEDICARE

## 2025-06-04 VITALS
TEMPERATURE: 93.6 F | OXYGEN SATURATION: 98 % | SYSTOLIC BLOOD PRESSURE: 121 MMHG | BODY MASS INDEX: 22.68 KG/M2 | WEIGHT: 123.25 LBS | HEIGHT: 62 IN | HEART RATE: 64 BPM | DIASTOLIC BLOOD PRESSURE: 77 MMHG

## 2025-06-04 VITALS
DIASTOLIC BLOOD PRESSURE: 77 MMHG | HEART RATE: 64 BPM | SYSTOLIC BLOOD PRESSURE: 121 MMHG | TEMPERATURE: 93.6 F | BODY MASS INDEX: 22.68 KG/M2 | WEIGHT: 123.25 LBS | OXYGEN SATURATION: 98 % | HEIGHT: 62 IN

## 2025-06-04 DIAGNOSIS — Z00.00 MEDICARE ANNUAL WELLNESS VISIT, SUBSEQUENT: Primary | ICD-10-CM

## 2025-06-04 DIAGNOSIS — E78.5 DYSLIPIDEMIA: ICD-10-CM

## 2025-06-04 DIAGNOSIS — Z00.01 ENCOUNTER FOR GENERAL ADULT MEDICAL EXAMINATION WITH ABNORMAL FINDINGS: Primary | ICD-10-CM

## 2025-06-04 DIAGNOSIS — R93.1 HIGH CORONARY ARTERY CALCIUM SCORE: ICD-10-CM

## 2025-06-04 DIAGNOSIS — R93.7 ABNORMAL BONE DENSITY SCREENING: ICD-10-CM

## 2025-06-04 DIAGNOSIS — N95.1 POST MENOPAUSAL SYNDROME: ICD-10-CM

## 2025-06-04 DIAGNOSIS — I10 ESSENTIAL HYPERTENSION, BENIGN: ICD-10-CM

## 2025-06-04 DIAGNOSIS — K21.9 GASTROESOPHAGEAL REFLUX DISEASE, UNSPECIFIED WHETHER ESOPHAGITIS PRESENT: ICD-10-CM

## 2025-06-04 DIAGNOSIS — Z91.89 HIGH RISK FOR COLON CANCER: ICD-10-CM

## 2025-06-04 DIAGNOSIS — C67.9 MALIGNANT NEOPLASM OF URINARY BLADDER, UNSPECIFIED SITE (MULTI): ICD-10-CM

## 2025-06-04 DIAGNOSIS — Z78.0 ASYMPTOMATIC MENOPAUSAL STATE: ICD-10-CM

## 2025-06-04 DIAGNOSIS — G47.00 INSOMNIA, UNSPECIFIED TYPE: ICD-10-CM

## 2025-06-04 DIAGNOSIS — Z12.11 ENCOUNTER FOR SCREENING FOR MALIGNANT NEOPLASM OF COLON: ICD-10-CM

## 2025-06-04 DIAGNOSIS — R60.9 EDEMA, UNSPECIFIED TYPE: ICD-10-CM

## 2025-06-04 PROCEDURE — 3078F DIAST BP <80 MM HG: CPT | Performed by: FAMILY MEDICINE

## 2025-06-04 PROCEDURE — UHSPHYS PR UH SELECT PHYSICAL: Performed by: FAMILY MEDICINE

## 2025-06-04 PROCEDURE — 1159F MED LIST DOCD IN RCRD: CPT | Performed by: FAMILY MEDICINE

## 2025-06-04 PROCEDURE — 1036F TOBACCO NON-USER: CPT | Performed by: FAMILY MEDICINE

## 2025-06-04 PROCEDURE — 1125F AMNT PAIN NOTED PAIN PRSNT: CPT | Performed by: FAMILY MEDICINE

## 2025-06-04 PROCEDURE — 3074F SYST BP LT 130 MM HG: CPT | Performed by: FAMILY MEDICINE

## 2025-06-04 ASSESSMENT — PATIENT HEALTH QUESTIONNAIRE - PHQ9
2. FEELING DOWN, DEPRESSED OR HOPELESS: SEVERAL DAYS
SUM OF ALL RESPONSES TO PHQ9 QUESTIONS 1 & 2: 1
1. LITTLE INTEREST OR PLEASURE IN DOING THINGS: NOT AT ALL

## 2025-06-04 ASSESSMENT — ENCOUNTER SYMPTOMS
OCCASIONAL FEELINGS OF UNSTEADINESS: 0
DEPRESSION: 0
LOSS OF SENSATION IN FEET: 0

## 2025-06-04 ASSESSMENT — PAIN SCALES - GENERAL
PAINLEVEL_OUTOF10: 2
PAINLEVEL_OUTOF10: 2

## 2025-06-04 NOTE — PROGRESS NOTES
Patient ID: Mckayla Michel is a 80 y.o. female who presents for Annual Exam ([facility]).    Health Risk Assessment  In general, health is:  good (2024 and 2025) - I would say better than that      Concerns with balance:  yes, but is keeping up with activity      Concerns with teeth or dentures:  no      Concerns with sexual function:  no - in a good place      Felt anxious, stressed, angry, irritable, lonely, isolated, or had thoughts of hurting themself:  to some degree      Has little interest or pleasure in doing things:  no     Bothered by feeling down, depressed, or hopeless:  no     Needs help with grocery shopping, cooking, housework, bathing, grooming, dressing, eating, sitting or standing, walking, using the toilet, handling finances, taking medications, using the telephone, or driving:  no      Following safety precautions in the home environment and vehicle: removed throw rugs from floors, installed grab bars in the bathroom, handrails in stairwells, having adequate lighting, wearing seatbelt at all times?:  yes     Smokes cigarettes, vapes, or chew tobacco:  no     Eats healthy foods including fruits, vegetables, whole grains, and fiber-rich foods:  yes     Number of days per week engages in exercise:  daily     Average alcohol consumption: 1-2 glasses 2x/week        Current Providers  Specialists: I have reviewed specialist-related care of the patient in the medical record.  Urol (Charlotte)   Ophtho (Alexi)  Derm (Jose G)  DDS (Zeinab)  Ortho: Federico  Cardio: Celina     Medical/Family history review  Reviewed and updated problem list, medical/surgical/family/social history, medications, and allergies.     Opioid use review  Patient use of opioids:  0    Depression screening  Depression Screening PHQ-2 Score    0        Cognitive screening  Mini Cog Score:       Cognitive screening reviewed and plan:  not indicated      Functional Observation  Was the patient's timed Up & Go test  "unsteady or >= 12 seconds?  no     Advance Care Planning  End of Life planning discussed, including patient's advanced directive wishes:  yes     Vision and Hearing assessment  Visual acuity (required for Welcome to Medicare):  up to date  Hearing Evaluation:  does not have trouble other than at restaurants     HPI    1) Here to follow-up hypertension/lipids/high CAC -denies chest pain, shortness of breath, dizziness, lightheadedness, hand or foot swelling that is new or different.  Taking and tolerating medications well.  Doing well with physical activity. Has appt to see Dr Patrick in July    2) ongoing issues with sleep struggles -trazodone has been very helpful, question if is contributing to dry mouth or the fact that she is sleeping on her back a lot because of shoulder pain and her urine bag -seems manageable though for now    3) GERD issues -did have an EGD last year by Dr. Anderson -no significant concerns other than \"reactive gastropathy\" -  taking and tolerating PPI well     4) History of  cancer -no acute issues, getting regular follow-up with Dr. Porter at the [facility]    5) Preventive care:  Breast cancer screening: Awaiting mammogram  Colon cancer screening: Although past age of 77 consider her high risk and would recommend pursuing, will seek to arrange with Dr. Anderson  Skin cancer screening: Does get regular care from dermatology  Bone density screening: Last done about 3 years ago and was in osteopenic range, her fracture risks were below targets -taking vitamin D and calcium daily  Immunizations: Up-to-date  Dental and Vision: UTD   Hearing screening: Recommend doing online [employer] test      Review of Systems  Essentially noncontributory otherwise    Objective   /77 (BP Location: Left arm, Patient Position: Sitting, BP Cuff Size: Adult)   Pulse 64   Temp 34.2 °C (93.6 °F)   Ht 1.575 m (5' 2\")   Wt 55.9 kg (123 lb 4 oz)   SpO2 98%   BMI 22.54 kg/m²     Physical Exam  Vitals " and nursing note reviewed.   Constitutional:       General: She is not in acute distress.     Appearance: She is not ill-appearing.   HENT:      Head: Normocephalic and atraumatic.      Right Ear: Tympanic membrane normal.      Left Ear: Tympanic membrane normal.      Mouth/Throat:      Pharynx: No posterior oropharyngeal erythema.   Eyes:      General: No scleral icterus.     Extraocular Movements: Extraocular movements intact.      Pupils: Pupils are equal, round, and reactive to light.   Cardiovascular:      Rate and Rhythm: Normal rate and regular rhythm.      Heart sounds: No murmur heard.  Pulmonary:      Breath sounds: Normal breath sounds. No wheezing or rhonchi.   Abdominal:      General: Bowel sounds are normal. There is no distension.      Palpations: Abdomen is soft.      Tenderness: There is no abdominal tenderness.   Musculoskeletal:         General: Normal range of motion.      Cervical back: Normal range of motion.      Right lower leg: Edema present.      Left lower leg: Edema present.   Lymphadenopathy:      Cervical: No cervical adenopathy.   Skin:     General: Skin is warm and dry.   Neurological:      Mental Status: She is alert and oriented to person, place, and time. Mental status is at baseline.      Motor: No weakness.      Coordination: Coordination normal.      Deep Tendon Reflexes: Reflexes normal.   Psychiatric:         Mood and Affect: Mood normal.         Behavior: Behavior normal.         Thought Content: Thought content normal.         Judgment: Judgment normal.         Assessment/Plan   Problem List Items Addressed This Visit       Gastroesophageal reflux disease    Malignant neoplasm of urinary bladder (Multi)    Dyslipidemia    Essential hypertension, benign     Other Visit Diagnoses         Encounter for general adult medical examination with abnormal findings    -  Primary      High coronary artery calcium score          Edema, unspecified type          Insomnia, unspecified  type          High risk for colon cancer        Relevant Orders    Colonoscopy Screening; High Risk Patient; Prior polyps - mother had Colon Cancer (passed in 70s)      Encounter for screening for malignant neoplasm of colon        Relevant Orders    Colonoscopy Screening; High Risk Patient; Prior polyps - mother had Colon Cancer (passed in 70s)      Post menopausal syndrome        Relevant Orders    XR DEXA bone density      Abnormal bone density screening        Relevant Orders    XR DEXA bone density      Asymptomatic menopausal state        Relevant Orders    XR DEXA bone density        1) CAD risk - doing well - to see Dr Patrick next month    2) Sleep struggles - cpm    3) GERD issues -cpm     4) History of  cancer -  follow-up Dr. Porter at the [facility]    5) Preventive care:  Breast cancer screening: Awaiting mammogram  Colon cancer screening: Although past age of 77 consider her high risk and would recommend pursuing, will seek to arrange with Dr. Anderson  Bone density screening: Last done about 3 years ago and was in osteopenic range, her fracture risks were below targets -taking vitamin D and calcium daily  Hearing screening: Recommend doing online [employer] test

## 2025-06-04 NOTE — Clinical Note
Please assist in scheduling BMD at  - last was at [facility] so can go anywhere in our system - Thanks!

## 2025-06-04 NOTE — PROGRESS NOTES
"Subjective   Patient ID: Mckayla Michel is a 80 y.o. female who presents for Annual Exam ( [facility]).  HPI  1) Here to follow-up hypertension/lipids/high CAC -denies chest pain, shortness of breath, dizziness, lightheadedness, hand or foot swelling that is new or different.  Taking and tolerating medications well.  Doing well with physical activity. Has appt to see Dr Patrick in July    2) ongoing issues with sleep struggles -trazodone has been very helpful, question if is contributing to dry mouth or the fact that she is sleeping on her back a lot because of shoulder pain and her urine bag -seems manageable though for now    3) GERD issues -did have an EGD last year by Dr. Anderson -no significant concerns other than \"reactive gastropathy\" -  taking and tolerating PPI well     4) History of  cancer -no acute issues, getting regular follow-up with Dr. Porter at the [facility]    5) Preventive care:  Breast cancer screening: Awaiting mammogram  Colon cancer screening: Although past age of 77 consider her high risk and would recommend pursuing, will seek to arrange with Dr. Anderson  Skin cancer screening: Does get regular care from dermatology  Bone density screening: Last done about 3 years ago and was in osteopenic range, her fracture risks were below targets -taking vitamin D and calcium daily  Immunizations: Up-to-date  Dental and Vision: UTD   Hearing screening: Recommend doing online [employer] test      Review of Systems  Essentially noncontributory otherwise    Objective   /77 (BP Location: Left arm, Patient Position: Sitting, BP Cuff Size: Adult)   Pulse 64   Temp 34.2 °C (93.6 °F)   Ht 1.575 m (5' 2\")   Wt 55.9 kg (123 lb 4 oz)   SpO2 98%   BMI 22.54 kg/m²     Physical Exam  Vitals and nursing note reviewed.   Constitutional:       General: She is not in acute distress.     Appearance: She is not ill-appearing.   HENT:      Head: Normocephalic and atraumatic.      Right Ear: Tympanic " membrane normal.      Left Ear: Tympanic membrane normal.      Mouth/Throat:      Pharynx: No posterior oropharyngeal erythema.   Eyes:      General: No scleral icterus.     Extraocular Movements: Extraocular movements intact.      Pupils: Pupils are equal, round, and reactive to light.   Cardiovascular:      Rate and Rhythm: Normal rate and regular rhythm.      Heart sounds: No murmur heard.  Pulmonary:      Breath sounds: Normal breath sounds. No wheezing or rhonchi.   Abdominal:      General: Bowel sounds are normal. There is no distension.      Palpations: Abdomen is soft.      Tenderness: There is no abdominal tenderness.   Musculoskeletal:         General: Normal range of motion.      Cervical back: Normal range of motion.      Right lower leg: Edema present.      Left lower leg: Edema present.   Lymphadenopathy:      Cervical: No cervical adenopathy.   Skin:     General: Skin is warm and dry.   Neurological:      Mental Status: She is alert and oriented to person, place, and time. Mental status is at baseline.      Motor: No weakness.      Coordination: Coordination normal.      Deep Tendon Reflexes: Reflexes normal.   Psychiatric:         Mood and Affect: Mood normal.         Behavior: Behavior normal.         Thought Content: Thought content normal.         Judgment: Judgment normal.         Assessment/Plan   Problem List Items Addressed This Visit       Gastroesophageal reflux disease    Malignant neoplasm of urinary bladder (Multi)    Dyslipidemia    Essential hypertension, benign     Other Visit Diagnoses         Encounter for general adult medical examination with abnormal findings    -  Primary      High coronary artery calcium score          Edema, unspecified type          Insomnia, unspecified type          High risk for colon cancer        Relevant Orders    Colonoscopy Screening; High Risk Patient; Prior polyps - mother had Colon Cancer (passed in 70s)      Encounter for screening for malignant  neoplasm of colon        Relevant Orders    Colonoscopy Screening; High Risk Patient; Prior polyps - mother had Colon Cancer (passed in 70s)      Post menopausal syndrome        Relevant Orders    XR DEXA bone density      Abnormal bone density screening        Relevant Orders    XR DEXA bone density      Asymptomatic menopausal state        Relevant Orders    XR DEXA bone density        1) CAD risk - doing well - to see Dr Patrick next month    2) Sleep struggles - cpm    3) GERD issues -cpm     4) History of  cancer -  follow-up Dr. Porter at the [facility]    5) Preventive care:  Breast cancer screening: Awaiting mammogram  Colon cancer screening: Although past age of 77 consider her high risk and would recommend pursuing, will seek to arrange with Dr. Anderson  Bone density screening: Last done about 3 years ago and was in osteopenic range, her fracture risks were below targets -taking vitamin D and calcium daily  Hearing screening: Recommend doing online [employer] test

## 2025-06-17 ENCOUNTER — APPOINTMENT (OUTPATIENT)
Dept: RADIOLOGY | Facility: CLINIC | Age: 80
End: 2025-06-17
Payer: MEDICARE

## 2025-06-17 ENCOUNTER — TELEPHONE (OUTPATIENT)
Dept: PRIMARY CARE | Facility: CLINIC | Age: 80
End: 2025-06-17

## 2025-06-17 DIAGNOSIS — I10 ESSENTIAL HYPERTENSION, BENIGN: ICD-10-CM

## 2025-06-17 DIAGNOSIS — Z78.0 ASYMPTOMATIC MENOPAUSAL STATE: ICD-10-CM

## 2025-06-17 DIAGNOSIS — N95.1 POST MENOPAUSAL SYNDROME: ICD-10-CM

## 2025-06-17 DIAGNOSIS — R93.7 ABNORMAL BONE DENSITY SCREENING: ICD-10-CM

## 2025-06-17 DIAGNOSIS — M85.80 OSTEOPENIA, UNSPECIFIED LOCATION: Primary | ICD-10-CM

## 2025-06-17 PROCEDURE — 77080 DXA BONE DENSITY AXIAL: CPT | Performed by: RADIOLOGY

## 2025-06-17 PROCEDURE — 77080 DXA BONE DENSITY AXIAL: CPT

## 2025-06-17 RX ORDER — AMILORIDE HYDROCHLORIDE 5 MG/1
5 TABLET ORAL DAILY
Qty: 30 TABLET | Refills: 11 | Status: SHIPPED | OUTPATIENT
Start: 2025-06-17 | End: 2026-06-17

## 2025-06-17 NOTE — TELEPHONE ENCOUNTER
Patient states that she developed a rash on her legs last week, from her thighs to he calf area.  She had done some reading and thought that it may have been from taking Aleve and she discontinued.  Rash did not improve.   She read something else about triamterene possibly causing the rash so she discontinued that.  The rash did slightly improve.   She did not call because she was hoping to manage on her own but she does feel she needs to make you aware and discuss with you.  She is hoping to speak with you before 6pm today.

## 2025-06-18 DIAGNOSIS — Z12.11 SPECIAL SCREENING FOR MALIGNANT NEOPLASMS, COLON: ICD-10-CM

## 2025-06-18 RX ORDER — POLYETHYLENE GLYCOL 3350, SODIUM SULFATE ANHYDROUS, SODIUM BICARBONATE, SODIUM CHLORIDE, POTASSIUM CHLORIDE 236; 22.74; 6.74; 5.86; 2.97 G/4L; G/4L; G/4L; G/4L; G/4L
4 POWDER, FOR SOLUTION ORAL ONCE
Qty: 4000 ML | Refills: 0 | Status: SHIPPED | OUTPATIENT
Start: 2025-06-18 | End: 2025-06-18

## 2025-06-18 NOTE — TELEPHONE ENCOUNTER
1) rash, patient reached out possible side effects of her diuretic causing a rash -when she stopped her triamterene hydrochlorothiazide this does seem like it has been fading over the past couple days at least  Would have her stay off that, will switch to unknown thiazide in the form of amiloride and see how she does with that  Anticipate she will update us about the rash as need be    2) did review recent bone density testing which shows osteopenia -did elevate her hip fracture risk a little above 3% although her total osteoporotic fracture risk remains well below 20%  She is averse to starting any medication for this  However willing to continue with her calcium/vitamin D supplement and will add additional vitamin D as her last D3 level was below 60 -did place an order in case she wants to check sometime in the near future

## 2025-07-02 ENCOUNTER — TELEPHONE (OUTPATIENT)
Dept: CARDIOLOGY | Facility: HOSPITAL | Age: 80
End: 2025-07-02
Payer: MEDICARE

## 2025-07-02 NOTE — TELEPHONE ENCOUNTER
Left a message for the patient about 77/25 appointment with Dr. Patrick at [facility]

## 2025-07-06 NOTE — PROGRESS NOTES
[facility] [place]       Primary Care Physician: Luis E Plunkett MD  Date of Visit: 07/07/2025  1:00 PM EDT     HPI / Summary:   Mckayla Michel is a 80 y.o. female with a history of hypertension, hyperlipidemia, elevated coronary artery calcium score (reportedly 540) and bladder cancer (s/p radical cystectomy) who presents for follow up.    At her initial visit she reported exercising every day. She felt very well and has no chest pressure, pain or dyspnea on exertion. She has had left worse than right distal left lower extremity swelling since her 40s. No orthopnea.     She has had more swelling in her left leg and sometimes her right. Worse at night and in hot weather. She tried hydrochlorothiazide but she had a reaction given the sulfa component so she switched to amiloride, which maybe has helped.       ROS: Relevant review of symptoms of negative unless discussed above.     Problems:   Patient Active Problem List   Diagnosis    Acquired hallux valgus of both feet    Contusion of right foot    Foot pain, right    Gastroesophageal reflux disease    Metatarsalgia of right foot    Malignant neoplasm of urinary bladder (Multi)    Dyslipidemia    Essential hypertension, benign    Family history of coronary artery disease       Medical History:   Past Medical History:   Diagnosis Date    Arthritis     Hypertension     Urinary system disease        Surgical Hx:   Past Surgical History:   Procedure Laterality Date    COLONOSCOPY  07/26/2018    Dr Frankel - rec 5 yr follow-up (has seen Monica)    ESOPHAGOGASTRODUODENOSCOPY  07/26/2018    Michael Frankel    HYSTERECTOMY  11/20/2017    Hysterectomy    OTHER SURGICAL HISTORY  2021    Urinary bladder excision    TONSILLECTOMY          Family Hx:   Family History   Problem Relation Name Age of Onset    Colon cancer Mother      Coronary artery disease Mother      Bipolar disorder Mother      Coronary artery disease Father      Kidney  "nephrosis Father      Osteoarthritis Sister Kaelyn     Other (Bowel) Sister Kaelyn     Alcohol abuse Sister Kaelyn     Osteoarthritis Sister Phuong     Kidney disease Sister Phuong     Colon cancer Maternal Grandmother      Other (Lyme Disease) Daughter Yudith     Other (POTS) Daughter Yudith     Lupus Daughter Yudith     Obesity Son Guero     Allergies Other      Heart disease Other      Hearing loss Other      Hypertension Other       Father  of a \"burst aorta\" after having an MI in age 47 (in ). Had angina for 10 years prior to that. Was overweight. Smoker.   Mother  at age 73 in [place] in her sleep. Smoked. HTN. Manic depression 5 years before death, was on lithium.   Older sister is 4.5 years older - gastric surgeries, rectal lift, knee replacements, HTN  Younger sister is 8 years younger - \"claims to be fine\".   Kids  Guero Michel (b ) - lives in [place],  with three kids. Healthy.   Yudith Saxena (b ) - has one daughter, live here.  to [facility]. Lyme disease, POTS. Thyroidectomy.     of prostate cancer.     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     Medications  Current Outpatient Medications   Medication Instructions    aMILoride (MIDAMOR) 5 mg, oral, Daily    aspirin 81 mg, oral, Daily    Bifidobacterium infantis (Align) 4 mg capsule 1 capsule, Every morning    calcium carbonate-vitamin D3 (Calcium 600 with Vitamin D3) 600 mg-10 mcg (400 unit) chewable tablet Calcium 600/Vitamin D 600-400 MG-UNIT Oral Tablet Chewable   Refills: 0       Active    calcium-chondr-collag-glycosam 300 mg calcium- 1,700 mg/scoop powder 1 Dose, Daily    " "EPINEPHrine 0.3 mg/0.3 mL injection syringe EPINEPHrine 0.3 MG/0.3ML Injection Solution Auto-injector   Quantity: 2  Refills: 0        Start : 25-Mar-2021   Active    esomeprazole (NexIUM) 20 mg DR capsule TAKE 1 CAPSULE BY MOUTH TWICE  DAILY DO NOT OPEN CAPSULE    losartan (COZAAR) 25 mg, oral, Daily    magnesium glycinate 100 mg magnesium capsule 2 capsules, Daily    rosuvastatin (Crestor) 10 mg tablet TAKE 1 TABLET BY MOUTH EVERY  NIGHT    traZODone (Desyrel) 50 mg tablet TAKE 1/2 TO 1 TABLET BY MOUTH  ONCE DAILY AT BEDTIME    triamcinolone (Kenalog) 0.1 % cream use as directed three times a day if needed    ubidecarenone (COENZYME Q10, BULK, MISC) Take by mouth.       Allergies  Thiazides, Bee venom protein (honey bee), Penicillins, Pollen extracts, and Sulfa (sulfonamide antibiotics)    Exam:   Vitals: /82 (BP Location: Left arm, Patient Position: Sitting, BP Cuff Size: Adult)   Pulse 65   Ht 1.575 m (5' 2\")   Wt 57.2 kg (126 lb)   SpO2 98%   BMI 23.05 kg/m²   Wt Readings from Last 5 Encounters:   07/07/25 57.2 kg (126 lb)   06/04/25 55.9 kg (123 lb 4 oz)   06/04/25 55.9 kg (123 lb 4 oz)   07/29/24 55.9 kg (123 lb 4.8 oz)   06/12/24 55.3 kg (122 lb)     GEN: Pleasant, well-appearing, no acute distress.  HEENT: JVP not elevated  CHEST: Clear to auscultation, No wheeze, good air movement.  CV: normal rate, regular rhythm with premature beats, no murmurs  EXT: Warm, well perfused, 1-2+ left lower extremity edema  NEURO: grossly non focal  SKIN: No obvious rashes     Labs:   Lipids  Lab Results   Component Value Date    CHOL 181 05/30/2025    CHOL 184 06/14/2024    CHOL 183 06/01/2023     Lab Results   Component Value Date    HDL 76.4 05/30/2025    HDL 95.1 06/14/2024    HDL 87.1 06/01/2023     Lab Results   Component Value Date    LDLCALC 86 05/30/2025    LDLCALC 72 06/14/2024     Lab Results   Component Value Date    TRIG 94 05/30/2025    TRIG 83 06/14/2024    TRIG 82 06/01/2023     No components found " "for: \"CHOLHDL\"    Hemoglobin A1C  Lab Results   Component Value Date    HGBA1C 5.5 05/30/2025       BMP  Lab Results   Component Value Date    GLUCOSE 91 05/30/2025    CALCIUM 9.9 05/30/2025     05/30/2025    K 4.1 05/30/2025    CO2 30 05/30/2025    CL 99 05/30/2025    BUN 23 05/30/2025    CREATININE 0.80 05/30/2025         Notable Studies: imaging personally reviewed and summarized by me below  EKG:  -7/29/2024: sinus bradycardia, HR 57 with sinus arrhythmia, normal R wave progression, no ST changes  -7/7/2025: NSR with few PACs, no ST changes    Echo:  -5/28/2019 at [facility]: LVIDD 3.8 cm, LVEF 65%, IVS 1.0 cm, posterior wall, 0.8 cm, normal diastolic function, trace MR due to mild posterior prolapse.     Ambulatory Rhythm Monitor:   -2019 at [facility]: 14-day monitor which showed predominantly sinus rhythm and a few short episodes of SVT up to 8 beats.  No other concerning findings.    Cardiac CT:  -CAC: Reportedly 540 around 2010    Assessment and Plan  Mckayla Michel is a 80 y.o. female with a history of hypertension, hyperlipidemia, elevated coronary artery calcium score (reportedly 540) and bladder cancer (s/p radical cystectomy) who was referred for cardiovascular evaluation.    High calcium score: discussed risk factors including genetics (both parents seem to have had heart disease of some sort), HTN and HLD. Her LDL is above goal, increase rosuvastatin to 20 mg for goal LDL <70. Continue aspirin 81 mg  HTN: borderline. Continue losartan though may increase to 50 mg if amiloride is stopped.    HLD: rosuvastatin as above.   Reportedly trace/mild mitral regurgitation on 2019 echo: no images available but by physical exam there is no significant murmur at rest or with handgrip to suggest more than mild mitral regurgitation. No absolute indication for repeat echocardiogram now.   LE edema: left worse than right, has been chronic. Maybe slightly helped with amiloride. She will consider whether she wants to continue or " stop. If she stops, may want to increase losartan to 50 mg.     Follow up in 1 year for further evaluation.      Avelino Patrick MD  Director, Sports Cardiology  [facility]    Part of this note was completed using dictation and voice recognition software. Please excuse minor errors and typos.     Time Spent: I spent 30 minutes reviewing medical testing, obtaining medical history and counselling and educating on diagnosis and documenting clinical encounter.

## 2025-07-07 ENCOUNTER — OFFICE VISIT (OUTPATIENT)
Dept: CARDIOLOGY | Facility: HOSPITAL | Age: 80
End: 2025-07-07
Payer: MEDICARE

## 2025-07-07 VITALS
OXYGEN SATURATION: 98 % | SYSTOLIC BLOOD PRESSURE: 131 MMHG | BODY MASS INDEX: 23.19 KG/M2 | HEART RATE: 65 BPM | DIASTOLIC BLOOD PRESSURE: 82 MMHG | WEIGHT: 126 LBS | HEIGHT: 62 IN

## 2025-07-07 DIAGNOSIS — R93.1 HIGH CORONARY ARTERY CALCIUM SCORE: ICD-10-CM

## 2025-07-07 DIAGNOSIS — I10 ESSENTIAL HYPERTENSION, BENIGN: ICD-10-CM

## 2025-07-07 DIAGNOSIS — E78.5 DYSLIPIDEMIA: Primary | ICD-10-CM

## 2025-07-07 LAB
ATRIAL RATE: 65 BPM
P AXIS: 61 DEGREES
P OFFSET: 200 MS
P ONSET: 151 MS
PR INTERVAL: 144 MS
Q ONSET: 223 MS
QRS COUNT: 10 BEATS
QRS DURATION: 82 MS
QT INTERVAL: 400 MS
QTC CALCULATION(BAZETT): 416 MS
QTC FREDERICIA: 410 MS
R AXIS: 27 DEGREES
T AXIS: 40 DEGREES
T OFFSET: 423 MS
VENTRICULAR RATE: 65 BPM

## 2025-07-07 PROCEDURE — 93005 ELECTROCARDIOGRAM TRACING: CPT | Performed by: INTERNAL MEDICINE

## 2025-07-07 PROCEDURE — 99214 OFFICE O/P EST MOD 30 MIN: CPT | Performed by: INTERNAL MEDICINE

## 2025-07-07 PROCEDURE — 3079F DIAST BP 80-89 MM HG: CPT | Performed by: INTERNAL MEDICINE

## 2025-07-07 PROCEDURE — 1159F MED LIST DOCD IN RCRD: CPT | Performed by: INTERNAL MEDICINE

## 2025-07-07 PROCEDURE — 99212 OFFICE O/P EST SF 10 MIN: CPT

## 2025-07-07 PROCEDURE — 3075F SYST BP GE 130 - 139MM HG: CPT | Performed by: INTERNAL MEDICINE

## 2025-07-07 RX ORDER — ROSUVASTATIN CALCIUM 20 MG/1
TABLET, COATED ORAL
Qty: 90 TABLET | Refills: 2 | Status: SHIPPED | OUTPATIENT
Start: 2025-07-07

## 2025-07-07 NOTE — Clinical Note
Hi Totea - I increase crestor slightly. She has the chronic left LE edema with some on the right, I think it's venous insufficiency/lymphatic more than anything. She will try compression stockings. May need to increase losartan if she stops the amiloride. -Chuy

## 2025-07-08 ENCOUNTER — TELEPHONE (OUTPATIENT)
Dept: PRIMARY CARE | Facility: CLINIC | Age: 80
End: 2025-07-08
Payer: MEDICARE

## 2025-07-08 DIAGNOSIS — I10 ESSENTIAL HYPERTENSION, BENIGN: ICD-10-CM

## 2025-07-08 NOTE — TELEPHONE ENCOUNTER
Mckayla calling with questions on medications after meeting with Cardiologist yesterday, wants to discuss appointment.

## 2025-07-09 RX ORDER — AMILORIDE HYDROCHLORIDE 5 MG/1
TABLET ORAL
Qty: 60 TABLET | Refills: 3 | Status: SHIPPED | OUTPATIENT
Start: 2025-07-09

## 2025-07-09 NOTE — TELEPHONE ENCOUNTER
Called and discussed with patient last night  But agree with increase of Crestor from 10 to 20 mg  Seems to be appropriate if she reduced her amiloride to just taking it on Tuesday, Thursday, Saturday and continue the losartan as doing -she is also going to try some compression to see if that helps with some of the leg edema  Plans to update us in the weeks ahead    Requested Prescriptions     Signed Prescriptions Disp Refills    aMILoride (Midamor) 5 mg tablet 60 tablet 3     Sig: Take 1 tablet daily on Tu/Th/Sa     Authorizing Provider: BERENICE LOGAN

## 2025-07-21 ENCOUNTER — TELEPHONE (OUTPATIENT)
Dept: PRIMARY CARE | Facility: CLINIC | Age: 80
End: 2025-07-21
Payer: MEDICARE

## 2025-07-21 DIAGNOSIS — I10 ESSENTIAL HYPERTENSION, BENIGN: ICD-10-CM

## 2025-07-21 RX ORDER — LOSARTAN POTASSIUM 25 MG/1
50 TABLET ORAL DAILY
COMMUNITY
Start: 2025-07-21

## 2025-07-21 NOTE — TELEPHONE ENCOUNTER
Called back and discussed - for the past week has had some higher readings - feels has gotten more anxious - things getting more complicated as having tests, upcoming surgery, family issues - still taking amiloride every other day - feeling tired, not handling stress well - exercise used to be the solution -can have some discomfort central chest concerned about possibly her heart but she had a recently reassuring ECG w/ Dr Patrick - discussed certainly could be stress also may be a reflux issue, encouraged her to continue to monitor and let us know    Suggest change losartan to 50 mg daily - check BP in a few days and plan to update us

## 2025-07-21 NOTE — TELEPHONE ENCOUNTER
Patient had elevated BP prior to the weekend.  When she first had checked it was 158/95.  This AM was 134/92.  She would like to speak with you asap as to whether or not she should take amiloride or if there should be med changes.  She hopes to go to the pharmacy today and would like cb asap.

## 2025-07-22 ENCOUNTER — APPOINTMENT (OUTPATIENT)
Dept: GASTROENTEROLOGY | Facility: EXTERNAL LOCATION | Age: 80
End: 2025-07-22
Payer: MEDICARE

## 2025-07-29 ENCOUNTER — APPOINTMENT (OUTPATIENT)
Dept: GASTROENTEROLOGY | Facility: EXTERNAL LOCATION | Age: 80
End: 2025-07-29
Payer: MEDICARE

## 2025-07-29 DIAGNOSIS — D12.2 BENIGN NEOPLASM OF ASCENDING COLON: ICD-10-CM

## 2025-07-29 DIAGNOSIS — Z12.11 ENCOUNTER FOR SCREENING FOR MALIGNANT NEOPLASM OF COLON: Primary | ICD-10-CM

## 2025-07-29 DIAGNOSIS — Z12.11 ENCOUNTER FOR SCREENING FOR MALIGNANT NEOPLASM OF COLON: ICD-10-CM

## 2025-07-29 DIAGNOSIS — Z86.0101 HISTORY OF ADENOMATOUS POLYP OF COLON: ICD-10-CM

## 2025-07-29 DIAGNOSIS — Z91.89 HIGH RISK FOR COLON CANCER: ICD-10-CM

## 2025-07-29 PROCEDURE — 45385 COLONOSCOPY W/LESION REMOVAL: CPT | Performed by: INTERNAL MEDICINE

## 2025-08-01 ENCOUNTER — LAB REQUISITION (OUTPATIENT)
Dept: LAB | Facility: HOSPITAL | Age: 80
End: 2025-08-01
Payer: MEDICARE

## 2025-08-11 ENCOUNTER — TELEPHONE (OUTPATIENT)
Dept: PRIMARY CARE | Facility: CLINIC | Age: 80
End: 2025-08-11
Payer: MEDICARE

## 2025-08-11 DIAGNOSIS — E04.1 THYROID NODULE: Primary | ICD-10-CM

## 2025-08-13 ENCOUNTER — HOSPITAL ENCOUNTER (OUTPATIENT)
Dept: RADIOLOGY | Facility: CLINIC | Age: 80
Discharge: HOME | End: 2025-08-13
Payer: MEDICARE

## 2025-08-13 DIAGNOSIS — E04.1 THYROID NODULE: ICD-10-CM

## 2025-08-13 PROCEDURE — 76536 US EXAM OF HEAD AND NECK: CPT

## 2025-08-18 ENCOUNTER — TELEPHONE (OUTPATIENT)
Dept: PRIMARY CARE | Facility: CLINIC | Age: 80
End: 2025-08-18
Payer: MEDICARE

## 2025-08-18 DIAGNOSIS — E78.5 DYSLIPIDEMIA: ICD-10-CM

## 2025-08-18 DIAGNOSIS — I10 ESSENTIAL HYPERTENSION, BENIGN: Primary | ICD-10-CM

## 2025-08-19 RX ORDER — LOSARTAN POTASSIUM 50 MG/1
50 TABLET ORAL DAILY
Qty: 100 TABLET | Refills: 3 | Status: SHIPPED | OUTPATIENT
Start: 2025-08-19 | End: 2026-09-23

## 2025-08-19 RX ORDER — ROSUVASTATIN CALCIUM 20 MG/1
TABLET, COATED ORAL
Qty: 100 TABLET | Refills: 3 | Status: SHIPPED | OUTPATIENT
Start: 2025-08-19